# Patient Record
Sex: MALE | Race: WHITE | NOT HISPANIC OR LATINO | Employment: FULL TIME | URBAN - METROPOLITAN AREA
[De-identification: names, ages, dates, MRNs, and addresses within clinical notes are randomized per-mention and may not be internally consistent; named-entity substitution may affect disease eponyms.]

---

## 2020-03-09 ENCOUNTER — OFFICE VISIT (OUTPATIENT)
Dept: URGENT CARE | Facility: MEDICAL CENTER | Age: 55
End: 2020-03-09
Payer: COMMERCIAL

## 2020-03-09 VITALS
TEMPERATURE: 97.4 F | BODY MASS INDEX: 32.13 KG/M2 | HEART RATE: 95 BPM | DIASTOLIC BLOOD PRESSURE: 78 MMHG | WEIGHT: 212 LBS | RESPIRATION RATE: 19 BRPM | SYSTOLIC BLOOD PRESSURE: 122 MMHG | HEIGHT: 68 IN | OXYGEN SATURATION: 97 %

## 2020-03-09 DIAGNOSIS — M79.651 PAIN OF RIGHT THIGH: Primary | ICD-10-CM

## 2020-03-09 PROCEDURE — G0382 LEV 3 HOSP TYPE B ED VISIT: HCPCS | Performed by: PHYSICIAN ASSISTANT

## 2020-03-09 RX ORDER — KETOROLAC TROMETHAMINE 30 MG/ML
30 INJECTION, SOLUTION INTRAMUSCULAR; INTRAVENOUS ONCE
Status: COMPLETED | OUTPATIENT
Start: 2020-03-09 | End: 2020-03-09

## 2020-03-09 RX ORDER — CYCLOBENZAPRINE HCL 10 MG
10 TABLET ORAL 3 TIMES DAILY PRN
Qty: 20 TABLET | Refills: 0 | Status: SHIPPED | OUTPATIENT
Start: 2020-03-09 | End: 2020-05-26

## 2020-03-09 RX ADMIN — KETOROLAC TROMETHAMINE 30 MG: 30 INJECTION, SOLUTION INTRAMUSCULAR; INTRAVENOUS at 17:13

## 2020-03-09 NOTE — PROGRESS NOTES
[facility]        NAME: Olivier Chau is a 63 y o  male  : 1965    MRN: [id number]  DATE: 2020  TIME: 5:06 PM    Assessment and Plan   Pain of right thigh [M79 [id number]]  1  Pain of right thigh  cyclobenzaprine (FLEXERIL) 10 mg tablet    ketorolac (TORADOL) injection 30 mg         Patient Instructions   Tylenol or Motrin for pain  Muscle relaxers at night  Use heat on the thigh  Follow up with PCP in 3-5 days  Proceed to  ER if symptoms worsen  Chief Complaint     Chief Complaint   Patient presents with    Leg Pain     Started today with right upper leg pain, denies injuries, pain is sharp and radiates down leg  Painful to the touch         History of Present Illness       Patient is a 50-year-old male who presents today with right upper leg pain that started this morning  Patient works for [employer] is constantly climbing up and down on his truck  He reports this aggravates the pain  He denies any known injuries  No numbness or tingling  No low back pain  No calf pain, leg swelling, warmth of the leg  He has tried Motrin today with little relief of pain  Review of Systems   Review of Systems   Constitutional: Negative for chills and fever  Respiratory: Negative for cough and shortness of breath  Cardiovascular: Negative for chest pain  Musculoskeletal: Positive for myalgias  Negative for back pain and joint swelling  Skin: Negative for rash  Neurological: Negative for numbness           Current Medications       Current Outpatient Medications:     cyclobenzaprine (FLEXERIL) 10 mg tablet, Take 1 tablet (10 mg total) by mouth 3 (three) times a day as needed for muscle spasms, Disp: 20 tablet, Rfl: 0    Current Facility-Administered Medications:     ketorolac (TORADOL) injection 30 mg, 30 mg, Intramuscular, Once, Rosalina Hung PA-C    Current Allergies     Allergies as of 2020    (No Known Allergies)            The following portions of the patient's history were reviewed and updated as appropriate: allergies, current medications, past family history, past medical history, past social history, past surgical history and problem list      History reviewed  No pertinent past medical history  Past Surgical History:   Procedure Laterality Date    HERNIA REPAIR         Family History   Problem Relation Age of Onset    Cancer Mother     No Known Problems Father          Medications have been verified  Objective   /78   Pulse 95   Temp (!) 97 4 °F (36 3 °C) (Temporal)   Resp 19   Ht 5' 8" (1 727 m)   Wt 96 2 kg (212 lb)   SpO2 97%   BMI 32 23 kg/m²        Physical Exam     Physical Exam   Constitutional: He appears well-developed and well-nourished  No distress  Cardiovascular: Normal rate and regular rhythm  Pulmonary/Chest: Effort normal and breath sounds normal    Musculoskeletal: Normal range of motion  He exhibits tenderness  He exhibits no edema or deformity  Right upper leg: He exhibits tenderness  He exhibits no bony tenderness, no swelling, no edema, no deformity and no laceration          Legs:

## 2020-05-26 ENCOUNTER — APPOINTMENT (OUTPATIENT)
Dept: RADIOLOGY | Facility: MEDICAL CENTER | Age: 55
End: 2020-05-26
Payer: COMMERCIAL

## 2020-05-26 ENCOUNTER — DOCUMENTATION (OUTPATIENT)
Dept: FAMILY MEDICINE CLINIC | Facility: CLINIC | Age: 55
End: 2020-05-26

## 2020-05-26 ENCOUNTER — OFFICE VISIT (OUTPATIENT)
Dept: FAMILY MEDICINE CLINIC | Facility: CLINIC | Age: 55
End: 2020-05-26
Payer: COMMERCIAL

## 2020-05-26 VITALS
TEMPERATURE: 98.3 F | HEART RATE: 96 BPM | SYSTOLIC BLOOD PRESSURE: 128 MMHG | HEIGHT: 68 IN | WEIGHT: 215 LBS | OXYGEN SATURATION: 94 % | BODY MASS INDEX: 32.58 KG/M2 | DIASTOLIC BLOOD PRESSURE: 88 MMHG | RESPIRATION RATE: 16 BRPM

## 2020-05-26 DIAGNOSIS — M79.672 FOOT PAIN, LEFT: Primary | ICD-10-CM

## 2020-05-26 DIAGNOSIS — Z12.5 SCREENING FOR PROSTATE CANCER: ICD-10-CM

## 2020-05-26 DIAGNOSIS — E66.9 CLASS 1 OBESITY WITH BODY MASS INDEX (BMI) OF 32.0 TO 32.9 IN ADULT, UNSPECIFIED OBESITY TYPE, UNSPECIFIED WHETHER SERIOUS COMORBIDITY PRESENT: ICD-10-CM

## 2020-05-26 DIAGNOSIS — B35.1 ONYCHOMYCOSIS: ICD-10-CM

## 2020-05-26 DIAGNOSIS — M79.672 FOOT PAIN, LEFT: ICD-10-CM

## 2020-05-26 DIAGNOSIS — R53.83 FATIGUE, UNSPECIFIED TYPE: ICD-10-CM

## 2020-05-26 PROCEDURE — 73630 X-RAY EXAM OF FOOT: CPT

## 2020-05-26 PROCEDURE — 3008F BODY MASS INDEX DOCD: CPT | Performed by: INTERNAL MEDICINE

## 2020-05-26 PROCEDURE — 1036F TOBACCO NON-USER: CPT | Performed by: INTERNAL MEDICINE

## 2020-05-26 PROCEDURE — 99203 OFFICE O/P NEW LOW 30 MIN: CPT | Performed by: INTERNAL MEDICINE

## 2020-05-26 RX ORDER — MELOXICAM 15 MG/1
15 TABLET ORAL DAILY
Qty: 30 TABLET | Refills: 1 | Status: SHIPPED | OUTPATIENT
Start: 2020-05-26 | End: 2020-10-07

## 2020-05-26 RX ORDER — DIPHENOXYLATE HYDROCHLORIDE AND ATROPINE SULFATE 2.5; .025 MG/1; MG/1
1 TABLET ORAL DAILY
COMMUNITY

## 2020-06-04 ENCOUNTER — TELEPHONE (OUTPATIENT)
Dept: FAMILY MEDICINE CLINIC | Facility: CLINIC | Age: 55
End: 2020-06-04

## 2020-10-07 ENCOUNTER — OFFICE VISIT (OUTPATIENT)
Dept: FAMILY MEDICINE CLINIC | Facility: CLINIC | Age: 55
End: 2020-10-07
Payer: COMMERCIAL

## 2020-10-07 VITALS
WEIGHT: 214 LBS | SYSTOLIC BLOOD PRESSURE: 114 MMHG | HEART RATE: 105 BPM | DIASTOLIC BLOOD PRESSURE: 82 MMHG | OXYGEN SATURATION: 98 % | BODY MASS INDEX: 32.43 KG/M2 | HEIGHT: 68 IN | TEMPERATURE: 98.2 F | RESPIRATION RATE: 16 BRPM

## 2020-10-07 DIAGNOSIS — Z23 ENCOUNTER FOR IMMUNIZATION: ICD-10-CM

## 2020-10-07 DIAGNOSIS — M54.2 NECK PAIN: Primary | ICD-10-CM

## 2020-10-07 PROCEDURE — 3725F SCREEN DEPRESSION PERFORMED: CPT | Performed by: INTERNAL MEDICINE

## 2020-10-07 PROCEDURE — 90471 IMMUNIZATION ADMIN: CPT | Performed by: INTERNAL MEDICINE

## 2020-10-07 PROCEDURE — 90682 RIV4 VACC RECOMBINANT DNA IM: CPT | Performed by: INTERNAL MEDICINE

## 2020-10-07 PROCEDURE — 1036F TOBACCO NON-USER: CPT | Performed by: INTERNAL MEDICINE

## 2020-10-07 PROCEDURE — 99213 OFFICE O/P EST LOW 20 MIN: CPT | Performed by: INTERNAL MEDICINE

## 2020-10-07 RX ORDER — IBUPROFEN 600 MG/1
600 TABLET ORAL EVERY 8 HOURS PRN
Qty: 30 TABLET | Refills: 0 | Status: SHIPPED | OUTPATIENT
Start: 2020-10-07

## 2020-10-07 RX ORDER — CYCLOBENZAPRINE HCL 10 MG
10 TABLET ORAL
Qty: 15 TABLET | Refills: 0 | Status: SHIPPED | OUTPATIENT
Start: 2020-10-07

## 2020-10-07 RX ORDER — METHYLPREDNISOLONE 4 MG/1
TABLET ORAL
Qty: 21 EACH | Refills: 0 | Status: SHIPPED | OUTPATIENT
Start: 2020-10-07 | End: 2020-12-12 | Stop reason: SDUPTHER

## 2020-12-12 DIAGNOSIS — M54.2 NECK PAIN: ICD-10-CM

## 2020-12-12 RX ORDER — METHYLPREDNISOLONE 4 MG/1
TABLET ORAL
Qty: 21 EACH | Refills: 0 | Status: SHIPPED | OUTPATIENT
Start: 2020-12-12

## 2021-04-08 DIAGNOSIS — Z23 ENCOUNTER FOR IMMUNIZATION: ICD-10-CM

## 2021-05-06 ENCOUNTER — TELEPHONE (OUTPATIENT)
Dept: FAMILY MEDICINE CLINIC | Facility: CLINIC | Age: 56
End: 2021-05-06

## 2021-05-06 NOTE — TELEPHONE ENCOUNTER
LMOM for patient to call his insurance and change his  with them   Bring name of representative and call reference #

## 2021-05-07 ENCOUNTER — OFFICE VISIT (OUTPATIENT)
Dept: FAMILY MEDICINE CLINIC | Facility: CLINIC | Age: 56
End: 2021-05-07
Payer: COMMERCIAL

## 2021-05-07 VITALS
OXYGEN SATURATION: 97 % | BODY MASS INDEX: 32.13 KG/M2 | RESPIRATION RATE: 18 BRPM | HEIGHT: 68 IN | HEART RATE: 103 BPM | DIASTOLIC BLOOD PRESSURE: 78 MMHG | SYSTOLIC BLOOD PRESSURE: 110 MMHG | TEMPERATURE: 97.6 F | WEIGHT: 212 LBS

## 2021-05-07 DIAGNOSIS — Z86.010 HISTORY OF COLON POLYPS: ICD-10-CM

## 2021-05-07 DIAGNOSIS — H60.91 OTITIS EXTERNA OF RIGHT EAR, UNSPECIFIED CHRONICITY, UNSPECIFIED TYPE: Primary | ICD-10-CM

## 2021-05-07 DIAGNOSIS — M19.90 OSTEOARTHRITIS, UNSPECIFIED OSTEOARTHRITIS TYPE, UNSPECIFIED SITE: ICD-10-CM

## 2021-05-07 PROCEDURE — 3008F BODY MASS INDEX DOCD: CPT | Performed by: INTERNAL MEDICINE

## 2021-05-07 PROCEDURE — 99214 OFFICE O/P EST MOD 30 MIN: CPT | Performed by: INTERNAL MEDICINE

## 2021-05-07 PROCEDURE — 3725F SCREEN DEPRESSION PERFORMED: CPT | Performed by: INTERNAL MEDICINE

## 2021-05-07 PROCEDURE — 1036F TOBACCO NON-USER: CPT | Performed by: INTERNAL MEDICINE

## 2021-05-07 RX ORDER — METHYLPREDNISOLONE 4 MG/1
TABLET ORAL
Qty: 21 EACH | Refills: 0 | Status: SHIPPED | OUTPATIENT
Start: 2021-05-07

## 2021-05-07 RX ORDER — CIPROFLOXACIN AND DEXAMETHASONE 3; 1 MG/ML; MG/ML
4 SUSPENSION/ DROPS AURICULAR (OTIC) 2 TIMES DAILY
Qty: 7.5 ML | Refills: 0 | Status: SHIPPED | OUTPATIENT
Start: 2021-05-07

## 2021-05-07 NOTE — PROGRESS NOTES
BMI Counseling: Body mass index is 32 23 kg/m²  The BMI is above normal  Nutrition recommendations include decreasing portion sizes and limiting drinks that contain sugar  Exercise recommendations include exercising 3-5 times per week  No pharmacotherapy was ordered  Patient referred to PCP due to patient being overweight  Assessment/Plan:         Diagnoses and all orders for this visit:    Otitis externa of right ear, unspecified chronicity, unspecified type  Comments:  ciprodex  Orders:  -     ciprofloxacin-dexamethasone (CIPRODEX) otic suspension; Administer 4 drops to the right ear 2 (two) times a day    History of colon polyps  Comments:  due for scope  Orders:  -     Ambulatory referral to Gastroenterology; Future    Osteoarthritis, unspecified osteoarthritis type, unspecified site  Comments:  request medrol for foot pain  Orders:  -     methylPREDNISolone 4 MG tablet therapy pack; Use as directed on package          Subjective:      Patient ID: Sameer Oneil is a 61 y o  male  Pt complains of rt ear ache x 1 week  It became worse 1-2 days ago  When he rolls on it with sleeping it awakens him      Earache   This is a new problem  The current episode started in the past 7 days  The problem occurs constantly  The problem has been gradually worsening  There has been no fever  The pain is at a severity of 3/10  Associated symptoms include ear discharge  Pertinent negatives include no abdominal pain, coughing, diarrhea, headaches, hearing loss, neck pain, rash, rhinorrhea, sore throat or vomiting  The following portions of the patient's history were reviewed and updated as appropriate: He  has no past medical history on file  He There are no active problems to display for this patient  He  has a past surgical history that includes Hernia repair and Prostate biopsy (Bilateral)  His family history includes Cancer in his mother; No Known Problems in his father    He  reports that he quit smoking about 9 years ago  He has a 10 00 pack-year smoking history  He has never used smokeless tobacco  He reports current alcohol use  He reports that he does not use drugs  Current Outpatient Medications   Medication Sig Dispense Refill    cyclobenzaprine (FLEXERIL) 10 mg tablet Take 1 tablet (10 mg total) by mouth daily at bedtime as needed for muscle spasms (take on nights that you do not have to get up for work the following morning) 15 tablet 0    ibuprofen (MOTRIN) 600 mg tablet Take 1 tablet (600 mg total) by mouth every 8 (eight) hours as needed for mild pain 30 tablet 0    methylPREDNISolone 4 MG tablet therapy pack Use as directed on package 21 each 0    multivitamin (THERAGRAN) TABS Take 1 tablet by mouth daily      ciprofloxacin-dexamethasone (CIPRODEX) otic suspension Administer 4 drops to the right ear 2 (two) times a day 7 5 mL 0    methylPREDNISolone 4 MG tablet therapy pack Use as directed on package 21 each 0     No current facility-administered medications for this visit  Current Outpatient Medications on File Prior to Visit   Medication Sig    cyclobenzaprine (FLEXERIL) 10 mg tablet Take 1 tablet (10 mg total) by mouth daily at bedtime as needed for muscle spasms (take on nights that you do not have to get up for work the following morning)    ibuprofen (MOTRIN) 600 mg tablet Take 1 tablet (600 mg total) by mouth every 8 (eight) hours as needed for mild pain    methylPREDNISolone 4 MG tablet therapy pack Use as directed on package    multivitamin (THERAGRAN) TABS Take 1 tablet by mouth daily     No current facility-administered medications on file prior to visit  He has No Known Allergies       Review of Systems   Constitutional: Negative for chills and fever  HENT: Positive for ear discharge and ear pain  Negative for hearing loss, rhinorrhea and sore throat  Respiratory: Negative for cough  Gastrointestinal: Negative for abdominal pain, diarrhea and vomiting  Musculoskeletal: Negative for neck pain  Skin: Negative for rash  Neurological: Negative for headaches  Objective:      /78   Pulse 103   Temp 97 6 °F (36 4 °C)   Resp 18   Ht 5' 8" (1 727 m)   Wt 96 2 kg (212 lb)   SpO2 97%   BMI 32 23 kg/m²          Physical Exam  Constitutional:       Appearance: He is obese  HENT:      Head: Normocephalic and atraumatic  Right Ear: Tympanic membrane and ear canal normal       Left Ear: Tympanic membrane and ear canal normal    Neck:      Musculoskeletal: Neck supple  Cardiovascular:      Rate and Rhythm: Normal rate and regular rhythm  Pulmonary:      Effort: Pulmonary effort is normal       Breath sounds: Normal breath sounds  Neurological:      Mental Status: He is alert  Answers for HPI/ROS submitted by the patient on 5/6/2021   Ear pain  Chronicity: new  Onset: in the past 7 days  Progression since onset: gradually worsening  Frequency: constantly  Fever: no fever  Pain - numeric: 3/10  abdominal pain: No  ear discharge:  Yes  rash: No  cough: No  headaches: No  rhinorrhea: No  diarrhea: No  hearing loss: No  sore throat: No  neck pain: No  vomiting: No

## 2021-08-13 ENCOUNTER — TELEPHONE (OUTPATIENT)
Dept: GASTROENTEROLOGY | Facility: CLINIC | Age: 56
End: 2021-08-13

## 2021-08-13 NOTE — TELEPHONE ENCOUNTER
Called and spoke with patient regarding scheduling a colonoscopy he is due for with Dr Loli Ponce for hx of adenoma polyps  He is busy with work and people being out, hard to get a day off  He will call then end of summer, he hasn't forgotten he is due  Will wait for patient to call

## 2022-09-02 DIAGNOSIS — Z13.220 SCREENING FOR CHOLESTEROL LEVEL: ICD-10-CM

## 2022-09-02 DIAGNOSIS — Z12.5 SCREENING FOR PROSTATE CANCER: ICD-10-CM

## 2022-09-02 DIAGNOSIS — Z00.00 BLOOD TESTS FOR ROUTINE GENERAL PHYSICAL EXAMINATION: ICD-10-CM

## 2022-09-03 ENCOUNTER — APPOINTMENT (OUTPATIENT)
Dept: LAB | Facility: MEDICAL CENTER | Age: 57
End: 2022-09-03
Payer: COMMERCIAL

## 2022-09-03 DIAGNOSIS — Z00.00 BLOOD TESTS FOR ROUTINE GENERAL PHYSICAL EXAMINATION: ICD-10-CM

## 2022-09-03 DIAGNOSIS — Z13.220 SCREENING FOR CHOLESTEROL LEVEL: ICD-10-CM

## 2022-09-03 DIAGNOSIS — Z12.5 SCREENING FOR PROSTATE CANCER: ICD-10-CM

## 2022-09-03 LAB
ALBUMIN SERPL BCP-MCNC: 3.8 G/DL (ref 3.5–5)
ALP SERPL-CCNC: 67 U/L (ref 46–116)
ALT SERPL W P-5'-P-CCNC: 27 U/L (ref 12–78)
ANION GAP SERPL CALCULATED.3IONS-SCNC: 6 MMOL/L (ref 4–13)
AST SERPL W P-5'-P-CCNC: 20 U/L (ref 5–45)
BASOPHILS # BLD AUTO: 0.04 THOUSANDS/ΜL (ref 0–0.1)
BASOPHILS NFR BLD AUTO: 0 % (ref 0–1)
BILIRUB SERPL-MCNC: 0.69 MG/DL (ref 0.2–1)
BUN SERPL-MCNC: 24 MG/DL (ref 5–25)
CALCIUM SERPL-MCNC: 9.1 MG/DL (ref 8.3–10.1)
CHLORIDE SERPL-SCNC: 108 MMOL/L (ref 96–108)
CHOLEST SERPL-MCNC: 201 MG/DL
CO2 SERPL-SCNC: 24 MMOL/L (ref 21–32)
CREAT SERPL-MCNC: 1.71 MG/DL (ref 0.6–1.3)
EOSINOPHIL # BLD AUTO: 0.23 THOUSAND/ΜL (ref 0–0.61)
EOSINOPHIL NFR BLD AUTO: 3 % (ref 0–6)
ERYTHROCYTE [DISTWIDTH] IN BLOOD BY AUTOMATED COUNT: 12.3 % (ref 11.6–15.1)
GFR SERPL CREATININE-BSD FRML MDRD: 43 ML/MIN/1.73SQ M
GLUCOSE P FAST SERPL-MCNC: 98 MG/DL (ref 65–99)
HCT VFR BLD AUTO: 44.3 % (ref 36.5–49.3)
HDLC SERPL-MCNC: 43 MG/DL
HGB BLD-MCNC: 14.6 G/DL (ref 12–17)
IMM GRANULOCYTES # BLD AUTO: 0.03 THOUSAND/UL (ref 0–0.2)
IMM GRANULOCYTES NFR BLD AUTO: 0 % (ref 0–2)
LDLC SERPL CALC-MCNC: 137 MG/DL (ref 0–100)
LYMPHOCYTES # BLD AUTO: 2.13 THOUSANDS/ΜL (ref 0.6–4.47)
LYMPHOCYTES NFR BLD AUTO: 24 % (ref 14–44)
MCH RBC QN AUTO: 30 PG (ref 26.8–34.3)
MCHC RBC AUTO-ENTMCNC: 33 G/DL (ref 31.4–37.4)
MCV RBC AUTO: 91 FL (ref 82–98)
MONOCYTES # BLD AUTO: 0.76 THOUSAND/ΜL (ref 0.17–1.22)
MONOCYTES NFR BLD AUTO: 9 % (ref 4–12)
NEUTROPHILS # BLD AUTO: 5.79 THOUSANDS/ΜL (ref 1.85–7.62)
NEUTS SEG NFR BLD AUTO: 64 % (ref 43–75)
NONHDLC SERPL-MCNC: 158 MG/DL
NRBC BLD AUTO-RTO: 0 /100 WBCS
PLATELET # BLD AUTO: 222 THOUSANDS/UL (ref 149–390)
PMV BLD AUTO: 10.2 FL (ref 8.9–12.7)
POTASSIUM SERPL-SCNC: 4.3 MMOL/L (ref 3.5–5.3)
PROT SERPL-MCNC: 7 G/DL (ref 6.4–8.4)
PSA SERPL-MCNC: 4.8 NG/ML (ref 0–4)
RBC # BLD AUTO: 4.86 MILLION/UL (ref 3.88–5.62)
SODIUM SERPL-SCNC: 138 MMOL/L (ref 135–147)
TRIGL SERPL-MCNC: 105 MG/DL
WBC # BLD AUTO: 8.98 THOUSAND/UL (ref 4.31–10.16)

## 2022-09-03 PROCEDURE — 36415 COLL VENOUS BLD VENIPUNCTURE: CPT

## 2022-09-03 PROCEDURE — 80053 COMPREHEN METABOLIC PANEL: CPT

## 2022-09-03 PROCEDURE — G0103 PSA SCREENING: HCPCS

## 2022-09-03 PROCEDURE — 85025 COMPLETE CBC W/AUTO DIFF WBC: CPT

## 2022-09-03 PROCEDURE — 80061 LIPID PANEL: CPT

## 2022-09-06 DIAGNOSIS — N18.30 STAGE 3 CHRONIC KIDNEY DISEASE, UNSPECIFIED WHETHER STAGE 3A OR 3B CKD (HCC): Primary | ICD-10-CM

## 2022-09-06 DIAGNOSIS — R97.20 ELEVATED PSA: ICD-10-CM

## 2022-09-17 ENCOUNTER — APPOINTMENT (OUTPATIENT)
Dept: LAB | Facility: MEDICAL CENTER | Age: 57
End: 2022-09-17
Payer: COMMERCIAL

## 2022-09-17 DIAGNOSIS — N18.30 STAGE 3 CHRONIC KIDNEY DISEASE, UNSPECIFIED WHETHER STAGE 3A OR 3B CKD (HCC): ICD-10-CM

## 2022-09-17 LAB
ANION GAP SERPL CALCULATED.3IONS-SCNC: 4 MMOL/L (ref 4–13)
BUN SERPL-MCNC: 31 MG/DL (ref 5–25)
CALCIUM SERPL-MCNC: 8.7 MG/DL (ref 8.3–10.1)
CHLORIDE SERPL-SCNC: 107 MMOL/L (ref 96–108)
CO2 SERPL-SCNC: 25 MMOL/L (ref 21–32)
CREAT SERPL-MCNC: 1.49 MG/DL (ref 0.6–1.3)
GFR SERPL CREATININE-BSD FRML MDRD: 51 ML/MIN/1.73SQ M
GLUCOSE P FAST SERPL-MCNC: 106 MG/DL (ref 65–99)
POTASSIUM SERPL-SCNC: 4.2 MMOL/L (ref 3.5–5.3)
SODIUM SERPL-SCNC: 136 MMOL/L (ref 135–147)

## 2022-09-17 PROCEDURE — 80048 BASIC METABOLIC PNL TOTAL CA: CPT

## 2022-09-17 PROCEDURE — 36415 COLL VENOUS BLD VENIPUNCTURE: CPT

## 2022-09-29 ENCOUNTER — OFFICE VISIT (OUTPATIENT)
Dept: FAMILY MEDICINE CLINIC | Facility: CLINIC | Age: 57
End: 2022-09-29
Payer: COMMERCIAL

## 2022-09-29 VITALS
SYSTOLIC BLOOD PRESSURE: 102 MMHG | HEIGHT: 68 IN | RESPIRATION RATE: 16 BRPM | BODY MASS INDEX: 32.89 KG/M2 | OXYGEN SATURATION: 97 % | TEMPERATURE: 98.3 F | DIASTOLIC BLOOD PRESSURE: 72 MMHG | WEIGHT: 217 LBS | HEART RATE: 95 BPM

## 2022-09-29 DIAGNOSIS — N52.9 ERECTILE DYSFUNCTION, UNSPECIFIED ERECTILE DYSFUNCTION TYPE: ICD-10-CM

## 2022-09-29 DIAGNOSIS — M79.672 LEFT FOOT PAIN: Primary | ICD-10-CM

## 2022-09-29 DIAGNOSIS — R97.20 ABNORMAL PSA: ICD-10-CM

## 2022-09-29 DIAGNOSIS — Z12.11 SCREEN FOR COLON CANCER: ICD-10-CM

## 2022-09-29 PROCEDURE — 3725F SCREEN DEPRESSION PERFORMED: CPT | Performed by: INTERNAL MEDICINE

## 2022-09-29 PROCEDURE — 99214 OFFICE O/P EST MOD 30 MIN: CPT | Performed by: INTERNAL MEDICINE

## 2022-09-29 RX ORDER — SILDENAFIL 50 MG/1
TABLET, FILM COATED ORAL
Qty: 10 TABLET | Refills: 0 | Status: SHIPPED | OUTPATIENT
Start: 2022-09-29

## 2022-09-29 NOTE — PROGRESS NOTES
BMI Counseling: Body mass index is 32 99 kg/m²  The BMI is above normal  Nutrition recommendations include decreasing portion sizes and limiting drinks that contain sugar  Exercise recommendations include exercising 3-5 times per week  No pharmacotherapy was ordered  Patient referred to PCP  Rationale for BMI follow-up plan is due to patient being overweight or obese  Depression Screening and Follow-up Plan: Patient was screened for depression during today's encounter  They screened negative with a PHQ-2 score of 0  Assessment/Plan:    No problem-specific Assessment & Plan notes found for this encounter  Diagnoses and all orders for this visit:    Left foot pain  Comments:  podiatry  Orders:  -     Ambulatory Referral to Podiatry; Future    Screen for colon cancer  -     Ambulatory Referral to Gastroenterology; Future    Abnormal PSA  Comments:  up coming urology    Erectile dysfunction, unspecified erectile dysfunction type  Comments:  trial viagra  Orders:  -     sildenafil (VIAGRA) 50 MG tablet; Take one tab one hour before desired affect  Subjective:      Patient ID: Jovanni Ibarra is a 61 y o  male  Pt complains of left foot pain  It was worse with getting in and out of his truck  Pt states foot still hurts  tx'd with meloxicam but felt dizzy with it  Also crt up and cut out nsaid  rec tylenol  He received a call from urology  Will f/u with them  He says gets am congestion q fall with allergins  Using otc and it helps      The following portions of the patient's history were reviewed and updated as appropriate: He  has no past medical history on file  He There are no problems to display for this patient  He  has a past surgical history that includes Hernia repair and Prostate biopsy (Bilateral)  His family history includes Cancer in his mother; No Known Problems in his father  He  reports that he quit smoking about 20 months ago  His smoking use included cigarettes   He has a 10 00 pack-year smoking history  He has never used smokeless tobacco  He reports current alcohol use  He reports that he does not use drugs  Current Outpatient Medications   Medication Sig Dispense Refill    multivitamin (THERAGRAN) TABS Take 1 tablet by mouth daily      sildenafil (VIAGRA) 50 MG tablet Take one tab one hour before desired affect  10 tablet 0    ciprofloxacin-dexamethasone (CIPRODEX) otic suspension Administer 4 drops to the right ear 2 (two) times a day (Patient not taking: Reported on 9/29/2022) 7 5 mL 0    cyclobenzaprine (FLEXERIL) 10 mg tablet Take 1 tablet (10 mg total) by mouth daily at bedtime as needed for muscle spasms (take on nights that you do not have to get up for work the following morning) (Patient not taking: Reported on 9/29/2022) 15 tablet 0     No current facility-administered medications for this visit  Current Outpatient Medications on File Prior to Visit   Medication Sig    multivitamin (THERAGRAN) TABS Take 1 tablet by mouth daily    ciprofloxacin-dexamethasone (CIPRODEX) otic suspension Administer 4 drops to the right ear 2 (two) times a day (Patient not taking: Reported on 9/29/2022)    cyclobenzaprine (FLEXERIL) 10 mg tablet Take 1 tablet (10 mg total) by mouth daily at bedtime as needed for muscle spasms (take on nights that you do not have to get up for work the following morning) (Patient not taking: Reported on 9/29/2022)    [DISCONTINUED] ibuprofen (MOTRIN) 600 mg tablet Take 1 tablet (600 mg total) by mouth every 8 (eight) hours as needed for mild pain (Patient not taking: Reported on 9/29/2022)     No current facility-administered medications on file prior to visit  He has No Known Allergies       Review of Systems   Constitutional: Negative  HENT: Negative  Respiratory: Negative  Cardiovascular: Negative            Objective:      /72 (BP Location: Left arm, Patient Position: Sitting, Cuff Size: Large)   Pulse 95   Temp 98 3 °F (36 8 °C) (Temporal)   Resp 16   Ht 5' 8" (1 727 m)   Wt 98 4 kg (217 lb)   SpO2 97%   BMI 32 99 kg/m²          Physical Exam  Constitutional:       Appearance: He is obese  HENT:      Head: Normocephalic and atraumatic  Right Ear: Tympanic membrane and ear canal normal       Left Ear: Tympanic membrane and ear canal normal    Cardiovascular:      Rate and Rhythm: Normal rate and regular rhythm  Pulmonary:      Effort: Pulmonary effort is normal       Breath sounds: Normal breath sounds  Musculoskeletal:      Cervical back: Neck supple  Lymphadenopathy:      Cervical: No cervical adenopathy  Neurological:      Mental Status: He is alert

## 2022-11-01 ENCOUNTER — TELEPHONE (OUTPATIENT)
Dept: UROLOGY | Facility: AMBULATORY SURGERY CENTER | Age: 57
End: 2022-11-01

## 2022-11-01 NOTE — TELEPHONE ENCOUNTER
What is the reason for the patient’s appointment? NP- elevated PSA 4 8    Patient can be reached at [phone]     What office location does the patient prefer? [facility] we accept the patient's insurance or is the patient Self-Pay? [employer]    Has the patient had any previous Urologist(s)? No    Have patient records been requested? No    Has the patient had any outside testing done? No    Does the patient have a personal history of cancer?   No

## 2022-11-30 ENCOUNTER — OFFICE VISIT (OUTPATIENT)
Dept: UROLOGY | Facility: CLINIC | Age: 57
End: 2022-11-30

## 2022-11-30 VITALS
BODY MASS INDEX: 31.83 KG/M2 | DIASTOLIC BLOOD PRESSURE: 84 MMHG | WEIGHT: 210 LBS | HEIGHT: 68 IN | SYSTOLIC BLOOD PRESSURE: 122 MMHG

## 2022-11-30 DIAGNOSIS — R97.20 ELEVATED PSA: Primary | ICD-10-CM

## 2022-11-30 DIAGNOSIS — R53.83 FATIGUE, UNSPECIFIED TYPE: ICD-10-CM

## 2022-11-30 NOTE — PROGRESS NOTES
UROLOGY CONSULTATION NOTE     Patient Identifiers: Leonarda Mcdonald (MRN: [id number])  Service Requesting Consultation: Camden Flores DO  Service Providing Consultation:  Urology, Sarah Hernandez PA-C  Consults  Date of Service: 2022    Reason for Consultation:  Elevated PSA    History of Present Illness:     Leonarda Mcdonald is a 61 y o  male with history of elevated PSA  He is referred from primary care for follow-up  He apparently had a prostate biopsy by Dr David Tsai about 10 years ago for elevated PSA which he reports was higher than the current 4 8  He denies any voiding complaints  He denies any family history of prostate cancer  He does have some fatigue and low libido  There is no upper tract imaging  There is no comparison PSA  Past Medical, Past Surgical History:   History reviewed  No pertinent past medical history :    Past Surgical History:   Procedure Laterality Date   • HERNIA REPAIR     • PROSTATE BIOPSY Bilateral    :    Medications, Allergies:     Current Outpatient Medications:   •  multivitamin (THERAGRAN) TABS, Take 1 tablet by mouth daily, Disp: , Rfl:   •  sildenafil (VIAGRA) 50 MG tablet, Take one tab one hour before desired affect , Disp: 10 tablet, Rfl: 0  •  Diclofenac Sodium (VOLTAREN) 1 %, apply 4 grams topically to affected area three times a day, Disp: , Rfl:     Allergies:  No Known Allergies:    Social and Family History:   Social History:   Social History     Tobacco Use   • Smoking status: Former     Packs/day: 0 50     Years: 20 00     Pack years: 10 00     Types: Cigarettes     Quit date:      Years since quittin 9   • Smokeless tobacco: Never   Vaping Use   • Vaping Use: Never used   Substance Use Topics   • Alcohol use: Yes     Comment: occasional   • Drug use: Never         Social History     Tobacco Use   Smoking Status Former   • Packs/day: 0 50   • Years: 20 00   • Pack years: 10 00   • Types: Cigarettes   • Quit date:    • Years since quittin 9 Smokeless Tobacco Never       Family History:  Family History   Problem Relation Age of Onset   • Cancer Mother    • No Known Problems Father    :     Review of Systems:     General: Fever, chills, or night sweats: negative  Cardiac: Negative for chest pain  Pulmonary: Negative for shortness of breath  Gastrointestinal: Abdominal pain negative  Nausea, vomiting, or diarrhea negative,  Genitourinary: See HPI above  Patient does not have hematuria  All other systems queried were negative  Physical Exam:   General: Patient is pleasant and in NAD  Awake and alert  /84 (BP Location: Left arm, Patient Position: Sitting, Cuff Size: Adult)   Ht 5' 8" (1 727 m)   Wt 95 3 kg (210 lb)   BMI 31 93 kg/m²   HEENT:  Conjunctiva are clear  Constitutional:  pleasant and cooperative     no apparent distress  Cardiac: Peripheral edema: negative  Pulmonary: Non-labored breathing  Abdomen: Soft, non-tender, non-distended  No surgical scars  No masses, tenderness, hernias noted  Genitourinary: Negative CVA tenderness, negative suprapubic tenderness  Extremities:  Moves all extremities  Neurological:CNII-XII intact  No numbness or tingling  Essentially non focal neurologic exam  Psychiatric:mood affect and behavior normal    (Male): Penis circumcised, phallus normal, meatus patent  Testicles descended into scrotum bilaterally without masses nor tenderness  No inguinal hernias bilaterally  AR: Prostate is enlarged at 35 grams  The prostate is not boggy  The prostate is not tender  No nodules noted          Labs:     Lab Results   Component Value Date    HGB 14 6 09/03/2022    HCT 44 3 09/03/2022    WBC 8 98 09/03/2022     09/03/2022   ]    Lab Results   Component Value Date    K 4 2 09/17/2022     09/17/2022    CO2 25 09/17/2022    BUN 31 (H) 09/17/2022    CREATININE 1 49 (H) 09/17/2022    CALCIUM 8 7 09/17/2022   ]    Imaging:   I personally reviewed the images and report of the following studies, and reviewed them with the patient:  None      ASSESSMENT:     1  Elevated PSA  2  Fatigue    PLAN:   -follow-up in 6 months his testosterone and PSA prior to visit  -we discussed the need for multiparametric MRI if the PSA should go higher    Thank you for allowing me to participate in this patients’ care  Please do not hesitate to call with any additional questions    Alaina Craig PA-C

## 2023-05-15 ENCOUNTER — TELEPHONE (OUTPATIENT)
Dept: OTHER | Facility: OTHER | Age: 58
End: 2023-05-15

## 2023-05-15 NOTE — TELEPHONE ENCOUNTER
Patient is calling regarding cancelling an appointment      Date/Time: 05/15/2023 @ 1515    Patient was rescheduled: YES [x] NO []    Patient requesting call back to reschedule: YES [] NO [x]

## 2023-05-27 ENCOUNTER — APPOINTMENT (OUTPATIENT)
Dept: LAB | Facility: MEDICAL CENTER | Age: 58
End: 2023-05-27

## 2023-05-27 DIAGNOSIS — R53.83 FATIGUE, UNSPECIFIED TYPE: ICD-10-CM

## 2023-05-27 DIAGNOSIS — N18.30 STAGE 3 CHRONIC KIDNEY DISEASE, UNSPECIFIED WHETHER STAGE 3A OR 3B CKD (HCC): ICD-10-CM

## 2023-05-27 DIAGNOSIS — R97.20 ELEVATED PSA: ICD-10-CM

## 2023-05-27 LAB
ANION GAP SERPL CALCULATED.3IONS-SCNC: 1 MMOL/L (ref 4–13)
BUN SERPL-MCNC: 24 MG/DL (ref 5–25)
CALCIUM SERPL-MCNC: 9.2 MG/DL (ref 8.3–10.1)
CHLORIDE SERPL-SCNC: 110 MMOL/L (ref 96–108)
CO2 SERPL-SCNC: 28 MMOL/L (ref 21–32)
CREAT SERPL-MCNC: 1.55 MG/DL (ref 0.6–1.3)
GFR SERPL CREATININE-BSD FRML MDRD: 48 ML/MIN/1.73SQ M
GLUCOSE P FAST SERPL-MCNC: 112 MG/DL (ref 65–99)
POTASSIUM SERPL-SCNC: 4.6 MMOL/L (ref 3.5–5.3)
PSA SERPL-MCNC: 9.09 NG/ML (ref 0–4)
SODIUM SERPL-SCNC: 139 MMOL/L (ref 135–147)

## 2023-05-30 LAB
TESTOST FREE SERPL-MCNC: 9.5 PG/ML (ref 7.2–24)
TESTOST SERPL-MCNC: 741 NG/DL (ref 264–916)

## 2023-05-31 ENCOUNTER — OFFICE VISIT (OUTPATIENT)
Dept: UROLOGY | Facility: CLINIC | Age: 58
End: 2023-05-31

## 2023-05-31 VITALS
HEIGHT: 68 IN | BODY MASS INDEX: 31.07 KG/M2 | WEIGHT: 205 LBS | RESPIRATION RATE: 18 BRPM | HEART RATE: 90 BPM | SYSTOLIC BLOOD PRESSURE: 128 MMHG | OXYGEN SATURATION: 97 % | DIASTOLIC BLOOD PRESSURE: 84 MMHG

## 2023-05-31 DIAGNOSIS — R97.20 ELEVATED PSA: Primary | ICD-10-CM

## 2023-05-31 NOTE — PROGRESS NOTES
UROLOGY PROGRESS NOTE   Patient Identifiers: Maulik Gaines (MRN [id number])  Date of Service: 5/31/2023    Subjective:   62-year-old male with history of elevated PSA  He had a prostate biopsy by Dr Karina Dewey about 10 years ago  Last PSA was 4 8  Current PSA is 9 09  He denies any family history of prostate cancer  Testosterone is over 700  Reason for visit: Elevated PSA follow-up    Objective:     VITALS:    There were no vitals filed for this visit    AUA SYMPTOM SCORE    Flowsheet Row Most Recent Value   AUA SYMPTOM SCORE    How often have you had a sensation of not emptying your bladder completely after you finished urinating? 1 (P)     How often have you had to urinate again less than two hours after you finished urinating? 1 (P)     How often have you found you stopped and started again several times when you urinate? 1 (P)     How often have you found it difficult to postpone urination? 0 (P)     How often have you had a weak urinary stream? 1 (P)     How often have you had to push or strain to begin urination? 0 (P)     How many times did you most typically get up to urinate from the time you went to bed at night until the time you got up in the morning? 3 (P)     Quality of Life: If you were to spend the rest of your life with your urinary condition just the way it is now, how would you feel about that? 2 (P)     AUA SYMPTOM SCORE 7 (P)             LABS:  Lab Results   Component Value Date    HCT 44 3 09/03/2022    HGB 14 6 09/03/2022     09/03/2022    WBC 8 98 09/03/2022   ]    Lab Results   Component Value Date    BUN 24 05/27/2023    CALCIUM 9 2 05/27/2023     (H) 05/27/2023    CO2 28 05/27/2023    CREATININE 1 55 (H) 05/27/2023    K 4 6 05/27/2023   ]        INPATIENT MEDS:    Current Outpatient Medications:   •  multivitamin (THERAGRAN) TABS, Take 1 tablet by mouth daily, Disp: , Rfl:   •  sildenafil (VIAGRA) 50 MG tablet, Take one tab one hour before desired affect , Disp: 10 tablet, Rfl: 0  •  Diclofenac Sodium (VOLTAREN) 1 %, apply 4 grams topically to affected area three times a day, Disp: , Rfl:       Physical Exam:   There were no vitals taken for this visit  GEN: no acute distress    RESP: breathing comfortably with no accessory muscle use    ABD: soft, non-tender, non-distended   INCISION:    EXT: no significant peripheral edema       RADIOLOGY:   none     Assessment:   1    Elevated PSA    Plan:   -I recommend a multiparametric MRI  -I will call him for PI-RADS 3 4 or 5 and schedule biopsy  -Otherwise he will follow-up in 6 months with repeat PSA prior to visit  -We discussed the need for prostate biopsy should his PSA remain elevated

## 2023-06-14 ENCOUNTER — APPOINTMENT (OUTPATIENT)
Dept: RADIOLOGY | Facility: MEDICAL CENTER | Age: 58
End: 2023-06-14
Payer: COMMERCIAL

## 2023-06-14 ENCOUNTER — OFFICE VISIT (OUTPATIENT)
Dept: FAMILY MEDICINE CLINIC | Facility: CLINIC | Age: 58
End: 2023-06-14
Payer: COMMERCIAL

## 2023-06-14 VITALS
TEMPERATURE: 97.6 F | OXYGEN SATURATION: 97 % | SYSTOLIC BLOOD PRESSURE: 128 MMHG | BODY MASS INDEX: 32.28 KG/M2 | DIASTOLIC BLOOD PRESSURE: 76 MMHG | HEART RATE: 96 BPM | WEIGHT: 213 LBS | HEIGHT: 68 IN

## 2023-06-14 DIAGNOSIS — M79.644 CHRONIC PAIN OF RIGHT THUMB: ICD-10-CM

## 2023-06-14 DIAGNOSIS — Z12.11 SCREEN FOR COLON CANCER: Primary | ICD-10-CM

## 2023-06-14 DIAGNOSIS — G89.29 CHRONIC PAIN OF RIGHT THUMB: ICD-10-CM

## 2023-06-14 PROCEDURE — 73140 X-RAY EXAM OF FINGER(S): CPT

## 2023-06-14 PROCEDURE — 99214 OFFICE O/P EST MOD 30 MIN: CPT | Performed by: FAMILY MEDICINE

## 2023-06-14 NOTE — PROGRESS NOTES
Assessment/Plan:    1  Screen for colon cancer  -     Ambulatory Referral to Gastroenterology; Future    2  Chronic pain of right thumb  -     XR thumb right first digit-min 2v; Future; Expected date: 06/14/2023    BMI Counseling: Body mass index is 32 39 kg/m²  The BMI is above normal  Nutrition recommendations include decreasing portion sizes, encouraging healthy choices of fruits and vegetables, decreasing fast food intake, consuming healthier snacks, limiting drinks that contain sugar, moderation in carbohydrate intake and increasing intake of lean protein  Exercise recommendations include moderate physical activity 150 minutes/week, exercising 3-5 times per week and strength training exercises  No pharmacotherapy was ordered  Patient referred to PCP  Rationale for BMI follow-up plan is due to patient being overweight or obese  Depression Screening and Follow-up Plan: Patient was screened for depression during today's encounter  They screened negative with a PHQ-2 score of 0  Patient Instructions   XR R thumb, consider OT or visit to hand ortho for steroid injection      No follow-ups on file  Subjective:      Patient ID: Daniel Traylor is a 60 y o  male  Chief Complaint   Patient presents with   • spot on back   • pain in thumbs       Here for lesion on back and b/l thumb pain  Pt noticed a lesion on his back about 1 month ago while drying off after a shower  Does not cause pt pain or bother him  Has had wife watching it over the past few weeks  Pt also concerned about b/l thumb pain that has been ongoing for a few months  More painful with grasping items and shaking hands  Goes to gym several days a week and is still able to do his normal routine, but needs to modify weight lifting to use his palms rather than grasping the bar/weights with his thumbs  Seems to be slightly worse in R>L thumb as he is right hand dominant   Denies numbness, tingling, and he denies pain travelling to the wrist or up "the arm  He admits to previous dx of arthritis in other joints including his left foot, b/l knees, right wrist  Has had steroid injection in right knee and tolerated procedure well  Pt has had rx for Meloxicam in the past but had ADR of dizziness and discontinued this  The following portions of the patient's history were reviewed and updated as appropriate: allergies, current medications, past family history, past medical history, past social history, past surgical history and problem list     Review of Systems   Constitutional: Negative for activity change, fatigue and fever  Respiratory: Negative for shortness of breath  Cardiovascular: Negative for chest pain  Gastrointestinal: Negative for abdominal pain, constipation, diarrhea and vomiting  Musculoskeletal: Positive for arthralgias (b/l thumb joints)  Negative for joint swelling  Skin:        Small, raised lesion of back   Neurological: Negative for dizziness, weakness and headaches  Psychiatric/Behavioral: Negative for confusion and sleep disturbance  Current Outpatient Medications   Medication Sig Dispense Refill   • Diclofenac Sodium (VOLTAREN) 1 % apply 4 grams topically to affected area three times a day     • multivitamin (THERAGRAN) TABS Take 1 tablet by mouth daily     • sildenafil (VIAGRA) 50 MG tablet Take one tab one hour before desired affect  (Patient not taking: Reported on 6/14/2023) 10 tablet 0     No current facility-administered medications for this visit  Objective:    /76 (BP Location: Right arm, Patient Position: Sitting, Cuff Size: Standard)   Pulse 96   Temp 97 6 °F (36 4 °C)   Ht 5' 8\" (1 727 m)   Wt 96 6 kg (213 lb)   SpO2 97%   BMI 32 39 kg/m²        Physical Exam  Vitals reviewed  Constitutional:       General: He is not in acute distress  Appearance: Normal appearance  Cardiovascular:      Rate and Rhythm: Normal rate and regular rhythm  Heart sounds: Normal heart sounds   No " "murmur heard  Pulmonary:      Effort: Pulmonary effort is normal  No respiratory distress  Breath sounds: Normal breath sounds  No wheezing  Musculoskeletal:         General: Tenderness present  Comments: Pain with palpation of thumb DIP b/l  Pain with grasping examiner's hand in a \"handshake\"    Skin:     General: Skin is warm and dry  Findings: Lesion (small, linear, raised, hyperpigemented lesion at the midline of the low back  Likely seborrheic keratosis) present  Neurological:      General: No focal deficit present  Mental Status: He is alert and oriented to person, place, and time  Psychiatric:         Mood and Affect: Mood normal          Behavior: Behavior normal          Thought Content:  Thought content normal          Judgment: Judgment normal                 Sanjay Mcknight MD  "

## 2023-06-19 ENCOUNTER — TELEPHONE (OUTPATIENT)
Dept: OBGYN CLINIC | Facility: OTHER | Age: 58
End: 2023-06-19

## 2023-06-19 DIAGNOSIS — M18.10 ARTHRITIS OF CARPOMETACARPAL (CMC) JOINT OF THUMB: Primary | ICD-10-CM

## 2023-06-19 NOTE — TELEPHONE ENCOUNTER
Patient is being referred to a orthopedics  Please schedule accordingly      [facility] [address]   [phone]

## 2023-06-29 ENCOUNTER — HOSPITAL ENCOUNTER (OUTPATIENT)
Dept: RADIOLOGY | Age: 58
Discharge: HOME | End: 2023-06-29
Payer: COMMERCIAL

## 2023-06-29 DIAGNOSIS — R97.20 ELEVATED PSA: ICD-10-CM

## 2023-06-29 PROCEDURE — 76377 3D RENDER W/INTRP POSTPROCES: CPT

## 2023-06-29 PROCEDURE — 72197 MRI PELVIS W/O & W/DYE: CPT

## 2023-06-29 PROCEDURE — G1004 CDSM NDSC: HCPCS

## 2023-06-29 PROCEDURE — A9585 GADOBUTROL INJECTION: HCPCS | Performed by: PHYSICIAN ASSISTANT

## 2023-06-29 RX ADMIN — GADOBUTROL 10 ML: 604.72 INJECTION INTRAVENOUS at 14:09

## 2023-07-20 ENCOUNTER — TELEPHONE (OUTPATIENT)
Dept: UROLOGY | Facility: AMBULATORY SURGERY CENTER | Age: 58
End: 2023-07-20

## 2023-07-20 NOTE — TELEPHONE ENCOUNTER
I spoke to the patient and scheduled his procedure for 9/13/2023 at [facility] with Dr. Truman Hardin.    -instructions given verbally and mailed  -patient aware to be NPO, needs a  and use an enema 1 hour prior to leaving the house morning of procedure  -CBC, CMP, Urine C&S and EKG 2 weeks prior  -PO/ZG 9/26/2023

## 2023-07-20 NOTE — TELEPHONE ENCOUNTER
Patient calling in requesting to speak with surgery scheduler. Patient requesting call back.      CB: [phone]

## 2023-08-30 ENCOUNTER — APPOINTMENT (OUTPATIENT)
Dept: LAB | Facility: MEDICAL CENTER | Age: 58
End: 2023-08-30
Payer: COMMERCIAL

## 2023-08-30 DIAGNOSIS — R97.20 ELEVATED PROSTATE SPECIFIC ANTIGEN (PSA): ICD-10-CM

## 2023-08-30 DIAGNOSIS — R97.20 ELEVATED PSA: ICD-10-CM

## 2023-08-30 LAB
ALBUMIN SERPL BCP-MCNC: 4 G/DL (ref 3.5–5)
ALP SERPL-CCNC: 65 U/L (ref 34–104)
ALT SERPL W P-5'-P-CCNC: 20 U/L (ref 7–52)
ANION GAP SERPL CALCULATED.3IONS-SCNC: 10 MMOL/L
AST SERPL W P-5'-P-CCNC: 21 U/L (ref 13–39)
BASOPHILS # BLD AUTO: 0.05 THOUSANDS/ÂΜL (ref 0–0.1)
BASOPHILS NFR BLD AUTO: 1 % (ref 0–1)
BILIRUB SERPL-MCNC: 0.4 MG/DL (ref 0.2–1)
BUN SERPL-MCNC: 28 MG/DL (ref 5–25)
CALCIUM SERPL-MCNC: 9 MG/DL (ref 8.4–10.2)
CHLORIDE SERPL-SCNC: 105 MMOL/L (ref 96–108)
CO2 SERPL-SCNC: 23 MMOL/L (ref 21–32)
CREAT SERPL-MCNC: 1.35 MG/DL (ref 0.6–1.3)
EOSINOPHIL # BLD AUTO: 0.16 THOUSAND/ÂΜL (ref 0–0.61)
EOSINOPHIL NFR BLD AUTO: 2 % (ref 0–6)
ERYTHROCYTE [DISTWIDTH] IN BLOOD BY AUTOMATED COUNT: 12.2 % (ref 11.6–15.1)
GFR SERPL CREATININE-BSD FRML MDRD: 57 ML/MIN/1.73SQ M
GLUCOSE P FAST SERPL-MCNC: 94 MG/DL (ref 65–99)
HCT VFR BLD AUTO: 41.4 % (ref 36.5–49.3)
HGB BLD-MCNC: 13.5 G/DL (ref 12–17)
IMM GRANULOCYTES # BLD AUTO: 0.02 THOUSAND/UL (ref 0–0.2)
IMM GRANULOCYTES NFR BLD AUTO: 0 % (ref 0–2)
LYMPHOCYTES # BLD AUTO: 1.95 THOUSANDS/ÂΜL (ref 0.6–4.47)
LYMPHOCYTES NFR BLD AUTO: 25 % (ref 14–44)
MCH RBC QN AUTO: 30.5 PG (ref 26.8–34.3)
MCHC RBC AUTO-ENTMCNC: 32.6 G/DL (ref 31.4–37.4)
MCV RBC AUTO: 94 FL (ref 82–98)
MONOCYTES # BLD AUTO: 0.53 THOUSAND/ÂΜL (ref 0.17–1.22)
MONOCYTES NFR BLD AUTO: 7 % (ref 4–12)
NEUTROPHILS # BLD AUTO: 4.99 THOUSANDS/ÂΜL (ref 1.85–7.62)
NEUTS SEG NFR BLD AUTO: 65 % (ref 43–75)
NRBC BLD AUTO-RTO: 0 /100 WBCS
PLATELET # BLD AUTO: 197 THOUSANDS/UL (ref 149–390)
PMV BLD AUTO: 10.3 FL (ref 8.9–12.7)
POTASSIUM SERPL-SCNC: 3.9 MMOL/L (ref 3.5–5.3)
PROT SERPL-MCNC: 6.2 G/DL (ref 6.4–8.4)
PSA SERPL-MCNC: 4.21 NG/ML (ref 0–4)
RBC # BLD AUTO: 4.43 MILLION/UL (ref 3.88–5.62)
SODIUM SERPL-SCNC: 138 MMOL/L (ref 135–147)
WBC # BLD AUTO: 7.7 THOUSAND/UL (ref 4.31–10.16)

## 2023-08-30 PROCEDURE — 36415 COLL VENOUS BLD VENIPUNCTURE: CPT

## 2023-08-30 PROCEDURE — 85025 COMPLETE CBC W/AUTO DIFF WBC: CPT

## 2023-08-30 PROCEDURE — 87086 URINE CULTURE/COLONY COUNT: CPT

## 2023-08-30 PROCEDURE — 80053 COMPREHEN METABOLIC PANEL: CPT

## 2023-08-30 PROCEDURE — 84153 ASSAY OF PSA TOTAL: CPT

## 2023-08-31 LAB — BACTERIA UR CULT: NORMAL

## 2023-09-06 NOTE — PRE-PROCEDURE INSTRUCTIONS
Pre-Surgery Instructions:   Medication Instructions   • Diclofenac Sodium (VOLTAREN) 1 % Stop taking 7 days prior to surgery. • multivitamin (THERAGRAN) TABS Stop taking 7 days prior to surgery. • sildenafil (VIAGRA) 50 MG tablet Stop taking 1 day prior to surgery. Medication instructions for day surgery reviewed. Please use only a sip of water to take your instructed medications. Avoid all over the counter vitamins, supplements and NSAIDS for one week prior to surgery per anesthesia guidelines. Tylenol is ok to take as needed. You will receive a call one business day prior to surgery with an arrival time and hospital directions. If your surgery is scheduled on a Monday, the hospital will be calling you on the Friday prior to your surgery. If you have not heard from anyone by 8pm, please call the hospital supervisor through the hospital  at [phone]. Do not eat or drink anything after midnight the night before your surgery, including candy, mints, lifesavers, or chewing gum. Do not drink alcohol 24hrs before your surgery. Try not to smoke at least 24hrs before your surgery. Follow the pre surgery showering instructions as listed in the [facility] Surgical Experience Booklet” or otherwise provided by your surgeon's office. Do not shave the surgical area 24 hours before surgery. Do not apply any lotions, creams, including makeup, cologne, deodorant, or perfumes after showering on the day of your surgery. No contact lenses, eye make-up, or artificial eyelashes. Remove nail polish, including gel polish, and any artificial, gel, or acrylic nails if possible. Remove all jewelry including rings and body piercing jewelry. Wear causal clothing that is easy to take on and off. Consider your type of surgery. Keep any valuables, jewelry, piercings at home. Please bring any specially ordered equipment (sling, braces) if indicated.     Arrange for a responsible person to drive you to and from the hospital on the day of your surgery. Visitor Guidelines discussed. Call the surgeon's office with any new illnesses, exposures, or additional questions prior to surgery. Please reference your [facility] Surgical Experience Booklet” for additional information to prepare for your upcoming surgery.

## 2023-09-12 RX ORDER — CEFAZOLIN SODIUM 2 G/50ML
2000 SOLUTION INTRAVENOUS
Status: COMPLETED | OUTPATIENT
Start: 2023-09-13 | End: 2023-09-13

## 2023-09-12 RX ORDER — CEFAZOLIN SODIUM 2 G/50ML
2000 SOLUTION INTRAVENOUS
Status: DISCONTINUED | OUTPATIENT
Start: 2023-09-13 | End: 2023-09-12 | Stop reason: SDUPTHER

## 2023-09-13 ENCOUNTER — ANESTHESIA (OUTPATIENT)
Dept: PERIOP | Facility: AMBULARY SURGERY CENTER | Age: 58
End: 2023-09-13
Payer: COMMERCIAL

## 2023-09-13 ENCOUNTER — ANESTHESIA EVENT (OUTPATIENT)
Dept: PERIOP | Facility: AMBULARY SURGERY CENTER | Age: 58
End: 2023-09-13
Payer: COMMERCIAL

## 2023-09-13 ENCOUNTER — HOSPITAL ENCOUNTER (OUTPATIENT)
Facility: AMBULARY SURGERY CENTER | Age: 58
Setting detail: OUTPATIENT SURGERY
Discharge: HOME | End: 2023-09-13
Attending: UROLOGY | Admitting: UROLOGY
Payer: COMMERCIAL

## 2023-09-13 VITALS
SYSTOLIC BLOOD PRESSURE: 118 MMHG | WEIGHT: 201 LBS | BODY MASS INDEX: 30.46 KG/M2 | RESPIRATION RATE: 16 BRPM | DIASTOLIC BLOOD PRESSURE: 71 MMHG | OXYGEN SATURATION: 97 % | HEART RATE: 69 BPM | TEMPERATURE: 97.7 F | HEIGHT: 68 IN

## 2023-09-13 DIAGNOSIS — R97.20 ELEVATED PROSTATE SPECIFIC ANTIGEN (PSA): ICD-10-CM

## 2023-09-13 PROCEDURE — NC001 PR NO CHARGE: Performed by: UROLOGY

## 2023-09-13 PROCEDURE — 76942 ECHO GUIDE FOR BIOPSY: CPT | Performed by: UROLOGY

## 2023-09-13 PROCEDURE — 55700 PR PROSTATE NEEDLE BIOPSY ANY APPROACH: CPT | Performed by: UROLOGY

## 2023-09-13 PROCEDURE — G0416 PROSTATE BIOPSY, ANY MTHD: HCPCS | Performed by: PATHOLOGY

## 2023-09-13 RX ORDER — OXYCODONE HYDROCHLORIDE 5 MG/1
5 TABLET ORAL EVERY 4 HOURS PRN
Status: DISCONTINUED | OUTPATIENT
Start: 2023-09-13 | End: 2023-09-13 | Stop reason: HOSPADM

## 2023-09-13 RX ORDER — PROPOFOL 10 MG/ML
INJECTION, EMULSION INTRAVENOUS AS NEEDED
Status: DISCONTINUED | OUTPATIENT
Start: 2023-09-13 | End: 2023-09-13

## 2023-09-13 RX ORDER — LIDOCAINE HYDROCHLORIDE 20 MG/ML
INJECTION, SOLUTION EPIDURAL; INFILTRATION; INTRACAUDAL; PERINEURAL AS NEEDED
Status: DISCONTINUED | OUTPATIENT
Start: 2023-09-13 | End: 2023-09-13 | Stop reason: HOSPADM

## 2023-09-13 RX ORDER — FENTANYL CITRATE 50 UG/ML
INJECTION, SOLUTION INTRAMUSCULAR; INTRAVENOUS AS NEEDED
Status: DISCONTINUED | OUTPATIENT
Start: 2023-09-13 | End: 2023-09-13

## 2023-09-13 RX ORDER — FENTANYL CITRATE/PF 50 MCG/ML
25 SYRINGE (ML) INJECTION
Status: CANCELLED | OUTPATIENT
Start: 2023-09-13

## 2023-09-13 RX ORDER — MIDAZOLAM HYDROCHLORIDE 2 MG/2ML
INJECTION, SOLUTION INTRAMUSCULAR; INTRAVENOUS AS NEEDED
Status: DISCONTINUED | OUTPATIENT
Start: 2023-09-13 | End: 2023-09-13

## 2023-09-13 RX ORDER — SODIUM CHLORIDE, SODIUM LACTATE, POTASSIUM CHLORIDE, CALCIUM CHLORIDE 600; 310; 30; 20 MG/100ML; MG/100ML; MG/100ML; MG/100ML
INJECTION, SOLUTION INTRAVENOUS CONTINUOUS PRN
Status: DISCONTINUED | OUTPATIENT
Start: 2023-09-13 | End: 2023-09-13

## 2023-09-13 RX ADMIN — PROPOFOL 50 MG: 10 INJECTION, EMULSION INTRAVENOUS at 08:53

## 2023-09-13 RX ADMIN — PROPOFOL 50 MG: 10 INJECTION, EMULSION INTRAVENOUS at 08:58

## 2023-09-13 RX ADMIN — PROPOFOL 50 MG: 10 INJECTION, EMULSION INTRAVENOUS at 08:50

## 2023-09-13 RX ADMIN — MIDAZOLAM 2 MG: 1 INJECTION INTRAMUSCULAR; INTRAVENOUS at 08:42

## 2023-09-13 RX ADMIN — FENTANYL CITRATE 50 MCG: 50 INJECTION INTRAMUSCULAR; INTRAVENOUS at 08:42

## 2023-09-13 RX ADMIN — SODIUM CHLORIDE, SODIUM LACTATE, POTASSIUM CHLORIDE, AND CALCIUM CHLORIDE: .6; .31; .03; .02 INJECTION, SOLUTION INTRAVENOUS at 08:44

## 2023-09-13 RX ADMIN — OXYCODONE HYDROCHLORIDE 5 MG: 5 TABLET ORAL at 10:08

## 2023-09-13 RX ADMIN — PROPOFOL 50 MG: 10 INJECTION, EMULSION INTRAVENOUS at 08:47

## 2023-09-13 RX ADMIN — CEFAZOLIN SODIUM 1000 MG: 2 SOLUTION INTRAVENOUS at 08:44

## 2023-09-13 RX ADMIN — FENTANYL CITRATE 50 MCG: 50 INJECTION INTRAMUSCULAR; INTRAVENOUS at 08:47

## 2023-09-13 NOTE — ANESTHESIA POSTPROCEDURE EVALUATION
Post-Op Assessment Note    CV Status:  Stable    Pain management: adequate     Mental Status:  Alert and awake   Hydration Status:  Euvolemic   PONV Controlled:  Controlled   Airway Patency:  Patent      Post Op Vitals Reviewed: Yes      Staff: CRNA, Anesthesiologist         No notable events documented.     BP   121/64   Temp  97   Pulse  78   Resp   15   SpO2   93

## 2023-09-13 NOTE — OP NOTE
OPERATIVE REPORT  PATIENT NAME: Natacha Benites    :  1965  MRN: [id number]  Pt Location: [facility] OR ROOM 05    SURGERY DATE: 2023    Surgeon(s) and Role:     Gaudencio Hayward MD - Primary    Preop Diagnosis:  Elevated prostate specific antigen (PSA) [R97.20]    Post-Op Diagnosis Codes:     * Elevated prostate specific antigen (PSA) [R97.20]    Procedure(s):  TRANSPERINEAL MRI FUSION BIOPSY PROSTATE    Specimen(s):  ID Type Source Tests Collected by Time Destination   1 : RIGHT ANTERIOR MEDIAL Tissue Prostate TISSUE EXAM Gaudencio Hayward MD 2023  8:34 AM    2 : RIGHT ANTERIOR LATERAL Tissue Prostate TISSUE EXAM Gaudencio Hayward MD 2023  8:34 AM    3 : RIGHT POSTERIOR LATERAL Tissue Prostate TISSUE EXAM Gaudencio Hayward MD 2023  8:34 AM    4 : LEFT ANTERIOR MEDIAL Tissue Prostate TISSUE EXAM Gaudencio Hayward MD 2023  8:34 AM    5 : LEFT ANTERIOR LATERAL Tissue Prostate TISSUE EXAM Gaudencio Hayward MD 2023  8:34 AM    6 : LEFT POSTERIOR LATERAL Tissue Prostate TISSUE EXAM Gaudencio Hayward MD 2023  8:34 AM    7 : LEFT POSTERIOR MEDIAL Tissue Prostate TISSUE EXAM Gaudencio Hayward MD 2023  8:34 AM    8 : LEFT BASE Tissue Prostate TISSUE EXAM Gaudencio Hayward MD 2023  8:34 AM    9 : RIGHT POSTERIOR MEDIAL Tissue Prostate TISSUE EXAM Gaudencio Hayward MD 2023  8:34 AM    10 : RIGHT BASE Tissue Prostate TISSUE EXAM Gaudencio Hayward MD 2023  8:34 AM    11 : Left posterior apex lesion Tissue Prostate TISSUE EXAM Gaudencio Hayward MD 2023  8:51 AM        Estimated Blood Loss:   Minimal    Drains:  * No LDAs found *    Anesthesia Type:   IV Sedation with Anesthesia    Operative Indications:  Elevated prostate specific antigen (PSA) [R97.20]      Procedure and Technique:  FINDINGS:  - A total of 14 samples are taken, in order to provide saturation sampling to maximize diagnostic sensitivity  - 4 samples are taken from TASHIA #1, which was labelled left posterior apex lesion        The patient is  is here for transperineal prostate saturation biopsy guided by biplanar transrectal ultrasound using the Precision Point Perineologic kit and using the uroNav device for MR fusion. The procedure, as well as the risks of bleeding, infection, and urinary retention have been explained he gives informed consent. The patient was brought to the operating room and identified properly. A time-out was performed. IV sedation was induced, and he was placed in the dorsal lithotomy position. The perineum shaved, and IO band was used to lift the scrotum away from the perineum. ChloraPrep was used to prep the perineum. Care was taken when placing the patient in the dorsal lithotomy position to make sure all pressure points were protected. .  The biplanar ultrasound probe was prepared with a condom and a 1 in wide piece of tape near the handle to place the precision Point kit on that., and I placed the Precision Point device with a clamp over the midportion of the ultrasound once near the handle over the tape. The aperture and guide needle were also prepared to be used later, and I placed the transrectal ultrasound probe into the rectum and visualized the prostate in sagittal and transverse views. This was used with a split screen. Transverse view was obtained, Volumetric sweep was performed for the purpose of fusing the MRI images with the ultrasound images. The perineum was anesthetized with 2% xylocaine 10 cc total both superficially and deep with a spinal needle on both sides of the median raphe. I then performed biopsies of the region of interest after placing the introducer needle into the perineum and took multiple biopsies with MR fusion guidance. Realtime ultrasound is used in addition to the Hilton verification of each target sampling.   Addition I verified on the back table that adequate sampling was obtained grossly from each specimen. Multiple samples were taken from the targeted lesion in order provide full saturation of the region of concern on MRI. I then performed standard transperineal prostate biopsy without MR guidance, taking the biopsies from the peripheral zone in the standard fashion of anterior medial anterior lateral and posterior medial posterior lateral and base region bilaterally. The top and second to top aperture settings were used to get the anterior zones, and the second to bottom aperture settings were used to obtain the posterior zones. These were all peripheral zone biopsies. Extra biopsies were taken in zones where the initial biopsy was suboptimal tissue. Care was taken to try to include the entire length of the prostate as much as possible for complete saturation coverage. Excellent prostate tissue cores were obtained, and specimens were sent to pathology. I withdrew the guide needle and I held pressure on the perineum for 1 minutes using gauze sponges. The perineum was then cleansed with sterile water.     Becky Majano MD          SIGNATURE: Becky Majano MD  DATE: September 13, 2023  TIME: 9:07 AM

## 2023-09-13 NOTE — ANESTHESIA PREPROCEDURE EVALUATION
Procedure:  TRANSPERINEAL MRI FUSION BIOPSY PROSTATE (Perineum)    Relevant Problems   ANESTHESIA (within normal limits)        Physical Exam    Airway    Mallampati score: II  TM Distance: >3 FB  Neck ROM: full     Dental   No notable dental hx     Cardiovascular  Rate: normal,     Pulmonary  Pulmonary exam normal     Other Findings        Anesthesia Plan  ASA Score- 3     Anesthesia Type- IV sedation with anesthesia with ASA Monitors. Additional Monitors:   Airway Plan:     Comment: Per patient, appropriately NPO, denies active CP/SOB/wheezing/symptoms related to heartburn/nausea/vomiting. Plan Factors-Exercise tolerance (METS): >4 METS. Chart reviewed. Patient summary reviewed. Patient is not a current smoker. Induction- intravenous. Postoperative Plan- Plan for postoperative opioid use. Informed Consent- Anesthetic plan and risks discussed with patient. I personally reviewed this patient with the CRNA. Discussed and agreed on the Anesthesia Plan with the CRNA. Fortunato Speaker

## 2023-09-13 NOTE — H&P
UROLOGY HISTORY AND PHYSICAL     Patient Identifiers: Jessie Gar (MRN [id number])      Date of Service: 2023        ASSESSMENT:     60 y.o. old male with  elevated PSA. PLAN:     Transperineal fusion biopsy of prostate      History of Present Illness:     Jessie Gar is a 60 y.o. old with a history of elevated PSA    Past Medical, Past Surgical History:   History reviewed. No pertinent past medical history.:    Past Surgical History:   Procedure Laterality Date   • HERNIA REPAIR     • PROSTATE BIOPSY Bilateral    :    Medications, Allergies:     Current Facility-Administered Medications:   •  ceFAZolin (ANCEF) IVPB (premix in dextrose) 2,000 mg 50 mL, 2,000 mg, Intravenous, On Call To OR, Abdirahman Nunn MD    Current Outpatient Medications:   •  Diclofenac Sodium (VOLTAREN) 1 %, as needed, Disp: , Rfl:   •  multivitamin (THERAGRAN) TABS, Take 1 tablet by mouth daily, Disp: , Rfl:   •  sildenafil (VIAGRA) 50 MG tablet, Take one tab one hour before desired affect., Disp: 10 tablet, Rfl: 0    Allergies:  No Known Allergies:    Social and Family History:   Social History:   Social History     Tobacco Use   • Smoking status: Former     Packs/day: 0.50     Years: 20.00     Total pack years: 10.00     Types: Cigarettes     Quit date:      Years since quittin.6   • Smokeless tobacco: Never   Vaping Use   • Vaping Use: Never used   Substance Use Topics   • Alcohol use: Yes     Comment: occasional   • Drug use: Never   . Social History     Tobacco Use   Smoking Status Former   • Packs/day: 0.50   • Years: 20.00   • Total pack years: 10.00   • Types: Cigarettes   • Quit date:    • Years since quittin.6   Smokeless Tobacco Never       Family History:  Family History   Problem Relation Age of Onset   • Cancer Mother    • No Known Problems Father    :     Review of Systems:     General: Fever, chills, or night sweats: negative  Cardiac: Negative for chest pain.     Pulmonary: Negative for shortness of breath. Gastrointestinal: Abdominal pain negative  Nausea, vomiting, or diarrhea negative  Genitourinary: See HPI above. Patient does nothave hematuria. All other systems queried were negative. Physical Exam:   General: Patient is pleasant and in NAD. Awake and alert  Ht 5' 8" (1.727 m)   Wt 95.3 kg (210 lb)   BMI 31.93 kg/m²   HEENT:  Normocephalic atraumatic  Cardiac:  Regular rate and rhythm, Peripheral edema: negative  Pulmonary: Non-labored breathing, CTAB  Abdomen: Soft, non-tender, non-distended. No surgical scars. No masses, tenderness, hernias noted. Genitourinary: negative CVA tenderness, neg suprapubic tenderness. Extremities: normal movement in all 4       Labs:     Lab Results   Component Value Date    HGB 13.5 08/30/2023    HCT 41.4 08/30/2023    WBC 7.70 08/30/2023     08/30/2023   ]    Lab Results   Component Value Date    K 3.9 08/30/2023     08/30/2023    CO2 23 08/30/2023    BUN 28 (H) 08/30/2023    CREATININE 1.35 (H) 08/30/2023    CALCIUM 9.0 08/30/2023   ]    Imaging:   I personally reviewed the images and report of the following studies, and reviewed them with the patient:        Thank you for allowing me to participate in this patients’ care. Please do not hesitate to call with any additional questions.   Hira Slater MD

## 2023-09-15 PROCEDURE — G0416 PROSTATE BIOPSY, ANY MTHD: HCPCS | Performed by: PATHOLOGY

## 2023-09-16 NOTE — RESULT ENCOUNTER NOTE
Please let the patient know the great news that their biopsy was negative for cancer! SHould followup as scheduled.   Thank you

## 2023-09-20 ENCOUNTER — TELEPHONE (OUTPATIENT)
Dept: UROLOGY | Facility: CLINIC | Age: 58
End: 2023-09-20

## 2023-09-25 NOTE — PROGRESS NOTES
Referring Physician: Matt Masters MD  A copy of this note was sent to the referring physician. Diagnoses and all orders for this visit:    Elevated PSA  -     PSA Total, Diagnostic; Future            Assessment and plan:       1. Elevated PSA status post negative prostate biopsy  -Transperineal MR fusion biopsy, September 13, 2023  -PSA at the time of negative biopsy 4.2 (prior PSA spike was up to 9.0)  - MRI prior to biopsy: 1 cm left peripheral apical lesion, ultimately biopsy-proven BPH nodule    #2 erectile dysfunction  -Poor response to sildenafil 50 mg  - We discussed some options, for now I have recommended titrating up to 100 mg as needed dosing. I was very happy to review the negative biopsy with the patient. He understands that he has no evidence of prostate cancer and that he will require continued prostate cancer screening. Non neoplastic etiologies for his elevated PSA were discussed such as underlying BPH or inflammation/prostatitis. The possibility of a false negative biopsy was also discussed. As such I recommended continued prostate cancer screening as the standard of care. He will follow up in 1 year with a PSA prior to the visit. If his PSA were to continue to rise above his current baseline, or demonstrated concerning PSA density or velocity we will evaluate further with a multiparametric MRI and/or subsequent consideration of transperineal/fusion biopsies as clinically dictated. Patient will follow-up in 1 year with advanced practitioner team with a repeat PSA or sooner if ever needed. Bhavin Hernadez MD      Chief Complaint     Biopsy follow-up      History of Present Illness     Jt Bui is a 60 y.o. returns in follow-up for elevated PSA    Detailed Urologic History     - please refer to HPI    Review of Systems     Review of Systems   Constitutional: Negative for activity change and fatigue. HENT: Negative for congestion.     Eyes: Negative for visual disturbance. Respiratory: Negative for shortness of breath and wheezing. Cardiovascular: Negative for chest pain and leg swelling. Gastrointestinal: Negative for abdominal pain. Endocrine: Negative for polyuria. Genitourinary: Negative for dysuria, flank pain, hematuria and urgency. Musculoskeletal: Negative for back pain. Allergic/Immunologic: Negative for immunocompromised state. Neurological: Negative for dizziness and numbness. Psychiatric/Behavioral: Negative for dysphoric mood. All other systems reviewed and are negative. Allergies     No Known Allergies    Physical Exam       Physical Exam  Constitutional:       General: He is not in acute distress. Appearance: He is well-developed. HENT:      Head: Normocephalic and atraumatic. Cardiovascular:      Comments: Negative lower extremity edema  Pulmonary:      Effort: Pulmonary effort is normal.      Breath sounds: Normal breath sounds. Abdominal:      Palpations: Abdomen is soft. Musculoskeletal:         General: Normal range of motion. Cervical back: Normal range of motion. Skin:     General: Skin is warm. Neurological:      Mental Status: He is alert and oriented to person, place, and time. Psychiatric:         Behavior: Behavior normal.           Vital Signs  There were no vitals filed for this visit. Current Medications       Current Outpatient Medications:   •  Diclofenac Sodium (VOLTAREN) 1 %, as needed, Disp: , Rfl:   •  multivitamin (THERAGRAN) TABS, Take 1 tablet by mouth daily, Disp: , Rfl:   •  sildenafil (VIAGRA) 50 MG tablet, Take one tab one hour before desired affect., Disp: 10 tablet, Rfl: 0      Active Problems     There is no problem list on file for this patient. Past Medical History     No past medical history on file.       Surgical History     Past Surgical History:   Procedure Laterality Date   • HERNIA REPAIR     • VA BX PROSTATE STRTCTC SATURATION SAMPLING IMG GID N/A 2023    Procedure: TRANSPERINEAL MRI FUSION BIOPSY PROSTATE;  Surgeon: Marcel Nolasco MD;  Location: [facility];  Service: Urology   • PROSTATE BIOPSY Bilateral          Family History     Family History   Problem Relation Age of Onset   • Cancer Mother    • No Known Problems Father          Social History     Social History     Social History     Tobacco Use   Smoking Status Former   • Packs/day: 0.50   • Years: 20.00   • Total pack years: 10.00   • Types: Cigarettes   • Quit date:    • Years since quittin.7   Smokeless Tobacco Never         Pertinent Lab Values     Lab Results   Component Value Date    CREATININE 1.35 (H) 2023       Lab Results   Component Value Date    PSA 4.21 (H) 2023    PSA 9.09 (H) 2023    PSA 4.8 (H) 2022       @RESULTRCNT(1H])@      Pertinent Imaging       No results found. Portions of the record may have been created with voice recognition software. Occasional wrong word or "sound a like" substitutions may have occurred due to the inherent limitations of voice recognition software. In addition some of the content generated from this outpatient encounter includes information designed for patient education and/or communication back to the referring provider. Read the chart carefully and recognize, using context, where substitutions have occurred.

## 2023-09-26 ENCOUNTER — OFFICE VISIT (OUTPATIENT)
Dept: UROLOGY | Facility: CLINIC | Age: 58
End: 2023-09-26
Payer: COMMERCIAL

## 2023-09-26 VITALS
BODY MASS INDEX: 31.98 KG/M2 | WEIGHT: 211 LBS | HEIGHT: 68 IN | SYSTOLIC BLOOD PRESSURE: 120 MMHG | HEART RATE: 87 BPM | DIASTOLIC BLOOD PRESSURE: 80 MMHG | OXYGEN SATURATION: 99 %

## 2023-09-26 DIAGNOSIS — R97.20 ELEVATED PSA: Primary | ICD-10-CM

## 2023-09-26 PROCEDURE — 99214 OFFICE O/P EST MOD 30 MIN: CPT | Performed by: UROLOGY

## 2023-10-19 ENCOUNTER — PREP FOR PROCEDURE (OUTPATIENT)
Age: 58
End: 2023-10-19

## 2023-10-19 ENCOUNTER — TELEPHONE (OUTPATIENT)
Age: 58
End: 2023-10-19

## 2023-10-19 DIAGNOSIS — Z86.010 HISTORY OF COLON POLYPS: Primary | ICD-10-CM

## 2023-10-19 NOTE — TELEPHONE ENCOUNTER
10/19/23  Screened by: Roger Osborne    Referring Provider     Pre- Screening: There is no height or weight on file to calculate BMI. Has patient been referred for a routine screening Colonoscopy? yes  Is the patient between 43-73 years old? yes      Previous Colonoscopy yes   If yes:    Date: 02/06/15    Facility: [facility]    Reason: Screening      SCHEDULING STAFF: If the patient is between 45yrs-49yrs, please advise patient to confirm benefits/coverage with their insurance company for a routine screening colonoscopy, some insurance carriers will only cover at [address] [place] [address] or older. If the patient is over 66years old, please schedule an office visit. Does the patient want to see a Gastroenterologist prior to their procedure OR are they having any GI symptoms? no    Has the patient been hospitalized or had abdominal surgery in the past 6 months? no    Does the patient use supplemental oxygen? no    Does the patient take Coumadin, Lovenox, Plavix, Elliquis, Xarelto, or other blood thinning medication? no    Has the patient had a stroke, cardiac event, or stent placed in the past year? no    SCHEDULING STAFF: If patient answers NO to above questions, then schedule procedure. If patient answers YES to above questions, then schedule office appointment. If patient is between 45yrs - 49yrs, please advise patient that we will have to confirm benefits & coverage with their insurance company for a routine screening colonoscopy.

## 2023-10-19 NOTE — TELEPHONE ENCOUNTER
Scheduled date of colonoscopy (as of today):11/8/2023  Physician performing colonoscopy:  Location of colonoscopy:[facility]  Bowel prep reviewed with patient: Ilana Reis  Instructions reviewed with patient by:Venus Goff  Clearances: N/A    Miralax dulcolax prep sent via [employer]

## 2023-10-24 ENCOUNTER — TELEPHONE (OUTPATIENT)
Dept: GASTROENTEROLOGY | Facility: CLINIC | Age: 58
End: 2023-10-24

## 2023-10-24 NOTE — TELEPHONE ENCOUNTER
Pt was scheduled on 11/10/23 with Dr. Gulshan Blair, however, he is no longer with the network. I called pt x2, phone rang and then went silent. Will call pt again tomorrow to try to reschedule.

## 2023-10-26 NOTE — TELEPHONE ENCOUNTER
I lmom informing pt that procedure on 11/10/23 had to be cancelled due to Dr Soto Beat no longer with the network and to please call back as we are trying to schedule within the same week. If pt calls back he can be scheduled on 11/7 or 11/8 with Dr. Britta Andrade at [facility]. Will call pt again in a few days if do not hear back from him.

## 2023-10-31 NOTE — TELEPHONE ENCOUNTER
Called pt trying to reschedule and the phone rang and then went silent x2. Will call pt again in a few days to try to reschedule.

## 2023-11-01 NOTE — TELEPHONE ENCOUNTER
Called pt again on cell phone and phone rang and then went silent. Called and lmoc for wife to please have pt call me back as I do now have openings with Dr Cinthia Beard on 11/10/23. Will call him again in a few days if do not hear back from him.

## 2024-03-21 ENCOUNTER — TELEPHONE (OUTPATIENT)
Age: 59
End: 2024-03-21

## 2024-03-21 NOTE — TELEPHONE ENCOUNTER
Scheduled date of colonoscopy (as of today): 5/13/24    Physician performing colonoscopy: Dr. Ríos    Location of colonoscopy:  [facility]    Bowel prep reviewed with patient: Miralax    Prep Instructions sent via [employer]

## 2024-03-21 NOTE — TELEPHONE ENCOUNTER
03/21/24  Screened by: Juani Gill    Referring Provider Dr. Harkins    Pre- Screening:     There is no height or weight on file to calculate BMI.  Has patient been referred for a routine screening Colonoscopy? yes  Is the patient between 45-75 years old? yes      Previous Colonoscopy yes   If yes:    Date: 10 years ago    Facility:     Reason:         Does the patient want to see a Gastroenterologist prior to their procedure OR are they having any GI symptoms? no    Has the patient been hospitalized or had abdominal surgery in the past 6 months? no    Does the patient use supplemental oxygen? no    Does the patient take Coumadin, Lovenox, Plavix, Elliquis, Xarelto, or other blood thinning medication? no    Has the patient had a stroke, cardiac event, or stent placed in the past year? no

## 2024-04-29 ENCOUNTER — ANESTHESIA (OUTPATIENT)
Dept: ANESTHESIOLOGY | Facility: HOSPITAL | Age: 59
End: 2024-04-29

## 2024-04-29 ENCOUNTER — ANESTHESIA EVENT (OUTPATIENT)
Dept: ANESTHESIOLOGY | Facility: HOSPITAL | Age: 59
End: 2024-04-29

## 2024-05-13 ENCOUNTER — ANESTHESIA (OUTPATIENT)
Dept: GASTROENTEROLOGY | Facility: AMBULARY SURGERY CENTER | Age: 59
End: 2024-05-13

## 2024-05-13 ENCOUNTER — HOSPITAL ENCOUNTER (OUTPATIENT)
Dept: GASTROENTEROLOGY | Facility: AMBULARY SURGERY CENTER | Age: 59
Setting detail: OUTPATIENT SURGERY
Discharge: HOME | End: 2024-05-13
Payer: COMMERCIAL

## 2024-05-13 ENCOUNTER — ANESTHESIA EVENT (OUTPATIENT)
Dept: GASTROENTEROLOGY | Facility: AMBULARY SURGERY CENTER | Age: 59
End: 2024-05-13

## 2024-05-13 VITALS
HEART RATE: 83 BPM | SYSTOLIC BLOOD PRESSURE: 110 MMHG | OXYGEN SATURATION: 99 % | RESPIRATION RATE: 18 BRPM | TEMPERATURE: 98.3 F | HEIGHT: 68 IN | WEIGHT: 198 LBS | BODY MASS INDEX: 30.01 KG/M2 | DIASTOLIC BLOOD PRESSURE: 66 MMHG

## 2024-05-13 DIAGNOSIS — Z86.010 HISTORY OF COLON POLYPS: ICD-10-CM

## 2024-05-13 PROCEDURE — 88305 TISSUE EXAM BY PATHOLOGIST: CPT | Performed by: PATHOLOGY

## 2024-05-13 PROCEDURE — 45390 COLONOSCOPY W/RESECTION: CPT | Performed by: INTERNAL MEDICINE

## 2024-05-13 PROCEDURE — 45385 COLONOSCOPY W/LESION REMOVAL: CPT | Performed by: INTERNAL MEDICINE

## 2024-05-13 RX ORDER — LIDOCAINE HYDROCHLORIDE 20 MG/ML
INJECTION, SOLUTION EPIDURAL; INFILTRATION; INTRACAUDAL; PERINEURAL AS NEEDED
Status: DISCONTINUED | OUTPATIENT
Start: 2024-05-13 | End: 2024-05-13

## 2024-05-13 RX ORDER — SODIUM CHLORIDE, SODIUM LACTATE, POTASSIUM CHLORIDE, CALCIUM CHLORIDE 600; 310; 30; 20 MG/100ML; MG/100ML; MG/100ML; MG/100ML
INJECTION, SOLUTION INTRAVENOUS CONTINUOUS PRN
Status: DISCONTINUED | OUTPATIENT
Start: 2024-05-13 | End: 2024-05-13

## 2024-05-13 RX ORDER — PROPOFOL 10 MG/ML
INJECTION, EMULSION INTRAVENOUS AS NEEDED
Status: DISCONTINUED | OUTPATIENT
Start: 2024-05-13 | End: 2024-05-13

## 2024-05-13 RX ADMIN — PROPOFOL 50 MG: 10 INJECTION, EMULSION INTRAVENOUS at 11:54

## 2024-05-13 RX ADMIN — SODIUM CHLORIDE, SODIUM LACTATE, POTASSIUM CHLORIDE, AND CALCIUM CHLORIDE: .6; .31; .03; .02 INJECTION, SOLUTION INTRAVENOUS at 11:30

## 2024-05-13 RX ADMIN — Medication 40 MG: at 11:50

## 2024-05-13 RX ADMIN — PROPOFOL 30 MG: 10 INJECTION, EMULSION INTRAVENOUS at 11:48

## 2024-05-13 RX ADMIN — PROPOFOL 40 MG: 10 INJECTION, EMULSION INTRAVENOUS at 11:58

## 2024-05-13 RX ADMIN — LIDOCAINE HYDROCHLORIDE 100 MG: 20 INJECTION, SOLUTION EPIDURAL; INFILTRATION; INTRACAUDAL at 11:45

## 2024-05-13 RX ADMIN — PROPOFOL 150 MG: 10 INJECTION, EMULSION INTRAVENOUS at 11:45

## 2024-05-13 RX ADMIN — PROPOFOL 30 MG: 10 INJECTION, EMULSION INTRAVENOUS at 11:51

## 2024-05-13 NOTE — ANESTHESIA POSTPROCEDURE EVALUATION
Post-Op Assessment Note    CV Status:  Stable  Pain Score: 0    Pain management: adequate       Mental Status:  Alert and awake   Hydration Status:  Euvolemic   PONV Controlled:  Controlled   Airway Patency:  Patent     Post Op Vitals Reviewed: Yes    No anethesia notable event occurred.    Staff: Anesthesiologist, CRNA               /54 (05/13/24 1207)    Temp 98.3 °F (36.8 °C) (05/13/24 1207)    Pulse 85 (05/13/24 1207)   Resp 15 (05/13/24 1207)    SpO2 97 % (05/13/24 1207)

## 2024-05-13 NOTE — ANESTHESIA PREPROCEDURE EVALUATION
Procedure:  COLONOSCOPY    Relevant Problems   No relevant active problems      No past medical history on file.  Lab Results   Component Value Date    WBC 7.70 08/30/2023    HGB 13.5 08/30/2023    HCT 41.4 08/30/2023    MCV 94 08/30/2023     08/30/2023       Physical Exam    Airway    Mallampati score: II  TM Distance: >3 FB  Neck ROM: full     Dental   No notable dental hx     Cardiovascular  Rhythm: regular, Rate: normal    Pulmonary   Breath sounds clear to auscultation    Other Findings  Intercisor Distance > 3cm          Anesthesia Plan  ASA Score- 2     Anesthesia Type- IV sedation with anesthesia with ASA Monitors.         Additional Monitors:     Airway Plan:     Comment: NPO appropriate. Discussed benefits/risks of monitored anesthetic care which involves providing a dynamic level of mild to deep sedation. Complications include awareness and/or airway obstruction/aspiration which may necessitate conversion to general anesthesia. All questions answered. Patient understands and wishes to proceed. .       Plan Factors-Exercise tolerance (METS): >4 METS.    Chart reviewed. EKG reviewed.  Existing labs reviewed.                   Induction-     Postoperative Plan- Plan for postoperative opioid use.     Informed Consent- Anesthetic plan and risks discussed with patient.  I personally reviewed this patient with the CRNA. Discussed and agreed on the Anesthesia Plan with the CRNA..

## 2024-05-13 NOTE — H&P
"History and Physical - [facility]  Vicente Bear 59 y.o. male MRN: [id number]                  HPI: Vicente Bear is a 59 y.o. year old male who presents for colonoscopy      REVIEW OF SYSTEMS: Per the HPI, and otherwise unremarkable.    Historical Information   Past Medical History:   Diagnosis Date    Colon polyp      Past Surgical History:   Procedure Laterality Date    HERNIA REPAIR      KS BX PROSTATE STRTCTC SATURATION SAMPLING IMG GID N/A 9/13/2023    Procedure: TRANSPERINEAL MRI FUSION BIOPSY PROSTATE;  Surgeon: Arron Salinas MD;  Location: [facility] MAIN OR;  Service: Urology    PROSTATE BIOPSY Bilateral      Social History   Social History     Substance and Sexual Activity   Alcohol Use Yes    Comment: occasional     Social History     Substance and Sexual Activity   Drug Use Never     Social History     Tobacco Use   Smoking Status Former    Current packs/day: 0.00    Average packs/day: 0.5 packs/day for 20.0 years (10.0 ttl pk-yrs)    Types: Cigarettes    Start date: 2001    Quit date: 2021    Years since quitting: 3.3   Smokeless Tobacco Never     Family History   Problem Relation Age of Onset    Cancer Mother     No Known Problems Father        Meds/Allergies       Current Outpatient Medications:     Diclofenac Sodium (VOLTAREN) 1 %    multivitamin (THERAGRAN) TABS    sildenafil (VIAGRA) 50 MG tablet    No Known Allergies    Objective     /71   Pulse 98   Temp (!) 97.1 °F (36.2 °C) (Temporal)   Resp 16   Ht 5' 8\" (1.727 m)   Wt 89.8 kg (198 lb)   SpO2 98%   BMI 30.11 kg/m²       PHYSICAL EXAM    Gen: NAD  Head: NCAT  CV: RRR  CHEST: Clear  ABD: soft, NT/ND  EXT: no edema      ASSESSMENT/PLAN:  This is a 59 y.o. year old male here for colonoscopy, and he is stable and optimized for his procedure.        "

## 2024-05-16 PROCEDURE — 88305 TISSUE EXAM BY PATHOLOGIST: CPT | Performed by: PATHOLOGY

## 2024-09-25 ENCOUNTER — TELEPHONE (OUTPATIENT)
Dept: UROLOGY | Facility: HOSPITAL | Age: 59
End: 2024-09-25

## 2024-09-25 DIAGNOSIS — R97.20 ELEVATED PSA: Primary | ICD-10-CM

## 2024-09-25 NOTE — TELEPHONE ENCOUNTER
Left message for patient asking if had psa lab work done possibly at a different lab for upcoming appointment

## 2024-10-04 ENCOUNTER — APPOINTMENT (OUTPATIENT)
Dept: LAB | Facility: MEDICAL CENTER | Age: 59
End: 2024-10-04
Payer: COMMERCIAL

## 2024-10-04 DIAGNOSIS — R97.20 ELEVATED PSA: ICD-10-CM

## 2024-10-04 LAB — PSA SERPL-MCNC: 10.43 NG/ML (ref 0–4)

## 2024-10-04 PROCEDURE — 84153 ASSAY OF PSA TOTAL: CPT

## 2024-10-04 PROCEDURE — 36415 COLL VENOUS BLD VENIPUNCTURE: CPT

## 2024-10-04 NOTE — TELEPHONE ENCOUNTER
Patient calling inquiring if he needs a PSA before his upcoming appointment. Informed he does and placed that lab order for him. He will be going to a [facility] lab later today to complete.    Confirmed upcoming appointment

## 2024-10-08 ENCOUNTER — OFFICE VISIT (OUTPATIENT)
Dept: UROLOGY | Facility: CLINIC | Age: 59
End: 2024-10-08
Payer: COMMERCIAL

## 2024-10-08 VITALS
DIASTOLIC BLOOD PRESSURE: 58 MMHG | HEART RATE: 89 BPM | BODY MASS INDEX: 30.46 KG/M2 | SYSTOLIC BLOOD PRESSURE: 110 MMHG | OXYGEN SATURATION: 99 % | WEIGHT: 201 LBS | HEIGHT: 68 IN

## 2024-10-08 DIAGNOSIS — R97.20 ELEVATED PSA: Primary | ICD-10-CM

## 2024-10-08 PROCEDURE — 99213 OFFICE O/P EST LOW 20 MIN: CPT

## 2024-10-08 NOTE — PROGRESS NOTES
10/8/2024      No chief complaint on file.        Assessment and Plan    59 y.o. male managed by Dr. Salinas    1.  Elevated PSA status post negative prostate biopsy  -Transperineal MRI fusion biopsy, 9/13/23  -PSA at the time of negative biopsy 4.2 (prior PSA spike was up to 9.0)  -MRI prior to biopsy (6/29/23): 1 cm left peripheral apical lesion, ultimately biopsy-proven BPH nodule  -Most recent PSA 10.434 (10/4/24)  -AR prostate is smooth, nontender. No nodules or asymmetry appreciated. About 50 g.  -We discussed that we will plan to repeat his PSA in 1 month, if it still remains elevated above his baseline we discussed repeating a multiparametric prostate at that time.     2. Erectile dysfunction  -Patient had poor response to sildenafil 100 mg. Patient expressed interest in trimix. I have faxed an RX over to [facility]. Patient will return in 1 month for trimix teaching.             History of Present Illness  Vicente Bear is a 59 y.o. male here for follow-up of elevated PSA and erectile dysfunction. His PSA is now 10.434. He states he does alternate at the gym between treadmill, biking, and rowing. We discussed how biking and rowing may falsely elevate his PSA so we will repeat it next month with a few days off from biking and rowing. He has no voiding complaints.    He previously was using sildenafil up to 100 mg without results. He expressed interest in trimix today. He will return for trimix teaching.        Review of Systems   Constitutional:  Negative for activity change, fatigue and fever.   HENT:  Negative for congestion, rhinorrhea and sore throat.    Eyes:  Negative for photophobia, redness and visual disturbance.   Respiratory:  Negative for cough, shortness of breath and wheezing.    Cardiovascular:  Negative for chest pain, palpitations and leg swelling.   Gastrointestinal:  Negative for abdominal pain, diarrhea, nausea and vomiting.   Genitourinary:  Negative for dysuria, flank pain,  frequency, hematuria and urgency.   Neurological:  Negative for weakness, light-headedness and headaches.           AUA SYMPTOM SCORE      Flowsheet Row Most Recent Value   AUA SYMPTOM SCORE    How often have you had a sensation of not emptying your bladder completely after you finished urinating? 1 (P)     How often have you had to urinate again less than two hours after you finished urinating? 1 (P)     How often have you found you stopped and started again several times when you urinate? 0 (P)     How often have you found it difficult to postpone urination? 0 (P)     How often have you had a weak urinary stream? 4 (P)     How often have you had to push or strain to begin urination? 0 (P)     How many times did you most typically get up to urinate from the time you went to bed at night until the time you got up in the morning? 5 (P)     Quality of Life: If you were to spend the rest of your life with your urinary condition just the way it is now, how would you feel about that? 2 (P)     AUA SYMPTOM SCORE 11 (P)               Vitals  There were no vitals filed for this visit.    Physical Exam  Constitutional:       Appearance: Normal appearance. He is not toxic-appearing.   HENT:      Head: Normocephalic.      Mouth/Throat:      Pharynx: Oropharynx is clear.   Eyes:      Extraocular Movements: Extraocular movements intact.      Pupils: Pupils are equal, round, and reactive to light.   Pulmonary:      Effort: Pulmonary effort is normal. No respiratory distress.   Genitourinary:     Comments: AR prostate is smooth, nontender. No nodules or asymmetry appreciated. About 50 g.  Musculoskeletal:         General: Normal range of motion.      Cervical back: Normal range of motion.   Neurological:      Mental Status: He is alert and oriented to person, place, and time. Mental status is at baseline.      Gait: Gait normal.   Psychiatric:         Mood and Affect: Mood normal.         Behavior: Behavior normal.          Thought Content: Thought content normal.         Judgment: Judgment normal.           Past History  Past Medical History:   Diagnosis Date    Colon polyp      Social History     Socioeconomic History    Marital status: /Civil Union     Spouse name: Not on file    Number of children: Not on file    Years of education: Not on file    Highest education level: Not on file   Occupational History    Not on file   Tobacco Use    Smoking status: Former     Current packs/day: 0.00     Average packs/day: 0.5 packs/day for 20.0 years (10.0 ttl pk-yrs)     Types: Cigarettes     Start date: 2001     Quit date: 2021     Years since quitting: 3.7    Smokeless tobacco: Never   Vaping Use    Vaping status: Never Used   Substance and Sexual Activity    Alcohol use: Yes     Comment: occasional    Drug use: Never    Sexual activity: Not on file   Other Topics Concern    Not on file   Social History Narrative    Not on file     Social Determinants of Health     Financial Resource Strain: Not on file   Food Insecurity: Not on file   Transportation Needs: Not on file   Physical Activity: Not on file   Stress: Not on file   Social Connections: Not on file   Intimate Partner Violence: Not on file   Housing Stability: Not on file     Social History     Tobacco Use   Smoking Status Former    Current packs/day: 0.00    Average packs/day: 0.5 packs/day for 20.0 years (10.0 ttl pk-yrs)    Types: Cigarettes    Start date: 2001    Quit date: 2021    Years since quitting: 3.7   Smokeless Tobacco Never     Family History   Problem Relation Age of Onset    Cancer Mother     No Known Problems Father        The following portions of the patient's history were reviewed and updated as appropriate: allergies, current medications, past medical history, past social history, past surgical history and problem list.    Results  No results found for this or any previous visit (from the past 1 hour(s)).]  Lab Results   Component Value Date    PSA 10.434  (H) 10/04/2024    PSA 4.21 (H) 08/30/2023    PSA 9.09 (H) 05/27/2023    PSA 4.8 (H) 09/03/2022     Lab Results   Component Value Date    CALCIUM 9.0 08/30/2023    K 3.9 08/30/2023    CO2 23 08/30/2023     08/30/2023    BUN 28 (H) 08/30/2023    CREATININE 1.35 (H) 08/30/2023     Lab Results   Component Value Date    WBC 7.70 08/30/2023    HGB 13.5 08/30/2023    HCT 41.4 08/30/2023    MCV 94 08/30/2023     08/30/2023       ANNIE Olson

## 2024-10-10 ENCOUNTER — TELEPHONE (OUTPATIENT)
Age: 59
End: 2024-10-10

## 2024-10-10 NOTE — TELEPHONE ENCOUNTER
[facility]  called the RX Refill Line. Message is being forwarded to the office.     Pharmacy is requesting clarification of the quanity on the prescription that was faxed to them today for trimix. Please review and contact the pharmacy     Please contact the pharmacy at [phone]

## 2024-10-26 ENCOUNTER — APPOINTMENT (OUTPATIENT)
Dept: LAB | Facility: MEDICAL CENTER | Age: 59
End: 2024-10-26
Payer: COMMERCIAL

## 2024-10-26 DIAGNOSIS — R97.20 ELEVATED PSA: ICD-10-CM

## 2024-10-26 LAB — PSA SERPL-MCNC: 5.2 NG/ML (ref 0–4)

## 2024-10-26 PROCEDURE — 84153 ASSAY OF PSA TOTAL: CPT

## 2024-10-26 PROCEDURE — 36415 COLL VENOUS BLD VENIPUNCTURE: CPT

## 2024-10-30 ENCOUNTER — PROCEDURE VISIT (OUTPATIENT)
Dept: UROLOGY | Facility: CLINIC | Age: 59
End: 2024-10-30
Payer: COMMERCIAL

## 2024-10-30 VITALS
SYSTOLIC BLOOD PRESSURE: 130 MMHG | OXYGEN SATURATION: 96 % | DIASTOLIC BLOOD PRESSURE: 76 MMHG | WEIGHT: 203 LBS | HEART RATE: 96 BPM | BODY MASS INDEX: 30.77 KG/M2 | HEIGHT: 68 IN

## 2024-10-30 DIAGNOSIS — R97.20 ELEVATED PSA: Primary | ICD-10-CM

## 2024-10-30 PROCEDURE — 99213 OFFICE O/P EST LOW 20 MIN: CPT

## 2024-10-30 NOTE — PROGRESS NOTES
10/30/2024      No chief complaint on file.        Assessment and Plan    59 y.o. male     Trimix Teaching  -patient did not bring Trimix. I called pharmacy to verify and they will have prescription ready for patient to .  -patient will return in a few weeks for Trimix teaching.    2. Elevated PSA  -PSA 5.203 (10/26/24). This has decreased from 10.434 (10/4/24)  -Transperineal MRI fusion biopsy, 9/13/23  -PSA at the time of negative biopsy 4.2 (prior PSA spike was up to 9.0)  -MRI prior to biopsy (6/29/23): 1 cm left peripheral apical lesion, ultimately biopsy-proven BPH nodule  -Normal AR  -We will plan to repeat patient's PSA in 6 months.     History of Present Illness  Vicente Bear is a 59 y.o. male here with history of erectile dysfunction and elevated PSA.         Review of Systems   Constitutional:  Negative for activity change, chills, fatigue and fever.   HENT:  Negative for congestion, rhinorrhea and sore throat.    Eyes:  Negative for photophobia, redness and visual disturbance.   Respiratory:  Negative for cough, shortness of breath and wheezing.    Cardiovascular:  Negative for chest pain, palpitations and leg swelling.   Gastrointestinal:  Negative for abdominal pain, diarrhea, nausea and vomiting.   Genitourinary:  Negative for dysuria, flank pain, frequency, hematuria and urgency.   Neurological:  Negative for weakness, light-headedness and headaches.           AUA SYMPTOM SCORE      Flowsheet Row Most Recent Value   AUA SYMPTOM SCORE    How often have you had a sensation of not emptying your bladder completely after you finished urinating? 0 (P)     How often have you had to urinate again less than two hours after you finished urinating? 1 (P)     How often have you found you stopped and started again several times when you urinate? 1 (P)     How often have you found it difficult to postpone urination? 0 (P)     How often have you had a weak urinary stream? 1 (P)     How often have you had  "to push or strain to begin urination? 0 (P)     How many times did you most typically get up to urinate from the time you went to bed at night until the time you got up in the morning? 1 (P)     Quality of Life: If you were to spend the rest of your life with your urinary condition just the way it is now, how would you feel about that? 1 (P)     AUA SYMPTOM SCORE 4 (P)               Vitals  Vitals:    10/30/24 1050   BP: 130/76   BP Location: Left arm   Patient Position: Sitting   Cuff Size: Standard   Pulse: 96   SpO2: 96%   Weight: 92.1 kg (203 lb)   Height: 5' 8\" (1.727 m)       Physical Exam  Constitutional:       Appearance: Normal appearance. He is not toxic-appearing.   HENT:      Head: Normocephalic.      Mouth/Throat:      Pharynx: Oropharynx is clear.   Eyes:      Extraocular Movements: Extraocular movements intact.      Pupils: Pupils are equal, round, and reactive to light.   Pulmonary:      Effort: Pulmonary effort is normal. No respiratory distress.   Musculoskeletal:         General: Normal range of motion.      Cervical back: Normal range of motion.   Neurological:      Mental Status: He is alert and oriented to person, place, and time. Mental status is at baseline.      Gait: Gait normal.   Psychiatric:         Mood and Affect: Mood normal.         Behavior: Behavior normal.         Thought Content: Thought content normal.         Judgment: Judgment normal.           Past History  Past Medical History:   Diagnosis Date    Colon polyp      Social History     Socioeconomic History    Marital status: /Civil Union     Spouse name: None    Number of children: None    Years of education: None    Highest education level: None   Occupational History    None   Tobacco Use    Smoking status: Former     Current packs/day: 0.00     Average packs/day: 0.5 packs/day for 20.0 years (10.0 ttl pk-yrs)     Types: Cigarettes     Start date: 2001     Quit date: 2021     Years since quitting: 3.8    Smokeless " tobacco: Never   Vaping Use    Vaping status: Never Used   Substance and Sexual Activity    Alcohol use: Yes     Comment: occasional    Drug use: Never    Sexual activity: None   Other Topics Concern    None   Social History Narrative    None     Social Determinants of Health     Financial Resource Strain: Not on file   Food Insecurity: Not on file   Transportation Needs: Not on file   Physical Activity: Not on file   Stress: Not on file   Social Connections: Not on file   Intimate Partner Violence: Not on file   Housing Stability: Not on file     Social History     Tobacco Use   Smoking Status Former    Current packs/day: 0.00    Average packs/day: 0.5 packs/day for 20.0 years (10.0 ttl pk-yrs)    Types: Cigarettes    Start date: 2001    Quit date: 2021    Years since quitting: 3.8   Smokeless Tobacco Never     Family History   Problem Relation Age of Onset    Cancer Mother     No Known Problems Father        The following portions of the patient's history were reviewed and updated as appropriate: allergies, current medications, past medical history, past social history, past surgical history and problem list.    Results  No results found for this or any previous visit (from the past 1 hour(s)).]  Lab Results   Component Value Date    PSA 5.203 (H) 10/26/2024    PSA 10.434 (H) 10/04/2024    PSA 4.21 (H) 08/30/2023    PSA 9.09 (H) 05/27/2023     Lab Results   Component Value Date    CALCIUM 9.0 08/30/2023    K 3.9 08/30/2023    CO2 23 08/30/2023     08/30/2023    BUN 28 (H) 08/30/2023    CREATININE 1.35 (H) 08/30/2023     Lab Results   Component Value Date    WBC 7.70 08/30/2023    HGB 13.5 08/30/2023    HCT 41.4 08/30/2023    MCV 94 08/30/2023     08/30/2023       ANNIE Olson

## 2024-11-13 ENCOUNTER — PROCEDURE VISIT (OUTPATIENT)
Dept: UROLOGY | Facility: CLINIC | Age: 59
End: 2024-11-13
Payer: COMMERCIAL

## 2024-11-13 VITALS
DIASTOLIC BLOOD PRESSURE: 80 MMHG | SYSTOLIC BLOOD PRESSURE: 128 MMHG | HEIGHT: 68 IN | WEIGHT: 203 LBS | BODY MASS INDEX: 30.77 KG/M2

## 2024-11-13 DIAGNOSIS — N52.9 ERECTILE DYSFUNCTION, UNSPECIFIED ERECTILE DYSFUNCTION TYPE: Primary | ICD-10-CM

## 2024-11-13 PROCEDURE — 99213 OFFICE O/P EST LOW 20 MIN: CPT

## 2024-11-13 NOTE — PROGRESS NOTES
"11/13/2024  Vicetne Bear  1965  [id number]      Assessment/Plan  Erectile dysfunction  Initiation of Tri Mix [10/30/1] injections at 0.1mL dose    Discussion  Vicente Bear is a 59 y.o. being managed by Dr. Salinas. He was provided verbal, written, and physical demonstration of intracavernosal injection use. He was instructed on storage, disposal, and titration of the medication. He was instructed on hospital precautions for a priapism and use of home pseudoephedrine.  He is instructed not to use more than 2-3 doses per week, alternate left/right injection sites with each use to prevent scarring. He was able to demonstrate understanding of injection use. All questions were answered.    Patient with NO history of sickle cell trait/disease, thrombocytopenia, cocaine or trazodone use. Has trialed various PDE5i either without sufficient benefit or with adverse side effects for which local therapy has been recommended instead.    History of Present Illness  52 y.o. male with ED presents today for intracavernosal injection teaching.    /80 (BP Location: Left arm, Patient Position: Sitting, Cuff Size: Adult)   Ht 5' 8\" (1.727 m)   Wt 92.1 kg (203 lb)   BMI 30.87 kg/m²       Procedure    Procedure: intracavernosal injection  Indication: Erectile Rehabilitation  unknown cause  Discussed:. Proper technique and instruction for intracavernosal injection were explained to the patient. risks of injection including but not limited to pain, bleeding, infection, fibrosis, and priapism (including the potential complications of priapism) were discussed.   Procedure: The skin overlying the injection site RIGHT side was then prepped with Alcohol. 0.1 ml of medication was injected into the corpora cavernosum.   Patient Status:. the patient was closely monitored for 10 minutes and reassessed. He tolerated the procedure well. Response to intracavernosal injection was fair  Complications:. No complications noted. "   Instructions:. the patient was counseled about priapism and instructed to return to the clinic or the emergency room if his erection lasted up to 4 hours. The patient expressed understanding of the injection technique and felt able to perform self-injection.

## 2024-12-28 ENCOUNTER — APPOINTMENT (OUTPATIENT)
Dept: RADIOLOGY | Facility: MEDICAL CENTER | Age: 59
End: 2024-12-28
Attending: PHYSICIAN ASSISTANT
Payer: COMMERCIAL

## 2024-12-28 ENCOUNTER — OFFICE VISIT (OUTPATIENT)
Dept: URGENT CARE | Facility: MEDICAL CENTER | Age: 59
End: 2024-12-28
Payer: COMMERCIAL

## 2024-12-28 VITALS
BODY MASS INDEX: 30.77 KG/M2 | RESPIRATION RATE: 18 BRPM | HEIGHT: 68 IN | HEART RATE: 109 BPM | TEMPERATURE: 98.9 F | OXYGEN SATURATION: 98 % | DIASTOLIC BLOOD PRESSURE: 85 MMHG | SYSTOLIC BLOOD PRESSURE: 121 MMHG | WEIGHT: 203 LBS

## 2024-12-28 DIAGNOSIS — R05.3 PERSISTENT COUGH: Primary | ICD-10-CM

## 2024-12-28 DIAGNOSIS — R05.3 PERSISTENT COUGH: ICD-10-CM

## 2024-12-28 PROCEDURE — 71046 X-RAY EXAM CHEST 2 VIEWS: CPT

## 2024-12-28 PROCEDURE — 99213 OFFICE O/P EST LOW 20 MIN: CPT | Performed by: PHYSICIAN ASSISTANT

## 2024-12-28 RX ORDER — AZITHROMYCIN 250 MG/1
250 TABLET, FILM COATED ORAL EVERY 24 HOURS
Qty: 6 TABLET | Refills: 0 | Status: SHIPPED | OUTPATIENT
Start: 2024-12-28 | End: 2025-01-02

## 2024-12-28 NOTE — PATIENT INSTRUCTIONS
Increase fluids  Take Zithromax 2 tablets today, then 1 days 2-5  Follow-up with PCP if symptoms worsen or persist.

## 2024-12-28 NOTE — PROGRESS NOTES
[facility]        NAME: Vicente Bear is a 59 y.o. male  : 1965    MRN: [id number]  DATE: 2024  TIME: 2:08 PM    Assessment and Plan   Persistent cough [R05.3]  1. Persistent cough  XR chest pa and lateral    azithromycin (ZITHROMAX) 250 mg tablet            Patient Instructions     Increase fluids  Take Zithromax 2 tablets today, then 1 days 2-5  Follow-up with PCP if symptoms worsen or persist.       If tests have been performed at [facility], our office will contact you with results if changes need to be made to the care plan discussed with you at the visit.  You can review your full results on [facility].    Chief Complaint     Chief Complaint   Patient presents with    Cold Like Symptoms     Patient states for a few weeks he has had worsening cough, chest congestion, post nasal drip; worse at night          History of Present Illness       Vicente is a 59-year-old male who presents with a 3-week history of persistent cough.  Patient reports his initial symptoms started with nasal congestion, runny nose and a slight cough.  His nasal symptoms have improved but the cough has persisted and worsened.  Patient denies any chest pain or shortness of breath.  He reports no fever, chills or bodyaches since the onset of his symptoms.        Review of Systems   Review of Systems   Constitutional: Negative.    HENT:  Positive for congestion.    Respiratory:  Positive for cough. Negative for chest tightness and shortness of breath.    Cardiovascular: Negative.    Gastrointestinal: Negative.          Current Medications       Current Outpatient Medications:     azithromycin (ZITHROMAX) 250 mg tablet, Take 1 tablet (250 mg total) by mouth every 24 hours for 5 days 2 day 1, then 1 days 2-5 (z-pack), Disp: 6 tablet, Rfl: 0    Diclofenac Sodium (VOLTAREN) 1 %, as needed, Disp: , Rfl:     multivitamin (THERAGRAN) TABS, Take 1 tablet by mouth daily, Disp: , Rfl:     Current Allergies     Allergies  "as of 12/28/2024    (No Known Allergies)            The following portions of the patient's history were reviewed and updated as appropriate: allergies, current medications, past family history, past medical history, past social history, past surgical history and problem list.     Past Medical History:   Diagnosis Date    Colon polyp        Past Surgical History:   Procedure Laterality Date    HERNIA REPAIR      MD BX PROSTATE STRTCTC SATURATION SAMPLING IMG GID N/A 9/13/2023    Procedure: TRANSPERINEAL MRI FUSION BIOPSY PROSTATE;  Surgeon: Arron Salinas MD;  Location: [facility] MAIN OR;  Service: Urology    PROSTATE BIOPSY Bilateral        Family History   Problem Relation Age of Onset    Cancer Mother     No Known Problems Father          Medications have been verified.        Objective   /85   Pulse (!) 109   Temp 98.9 °F (37.2 °C) (Temporal)   Resp 18   Ht 5' 8\" (1.727 m)   Wt 92.1 kg (203 lb)   SpO2 98%   BMI 30.87 kg/m²   No LMP for male patient.       Physical Exam     Physical Exam  Constitutional:       General: He is not in acute distress.     Appearance: Normal appearance. He is not ill-appearing.   HENT:      Head: Normocephalic and atraumatic.      Right Ear: Tympanic membrane and ear canal normal.      Left Ear: Tympanic membrane and ear canal normal.      Nose: Nose normal.      Mouth/Throat:      Lips: Pink.      Pharynx: Oropharynx is clear.   Cardiovascular:      Rate and Rhythm: Normal rate and regular rhythm.      Heart sounds: Normal heart sounds, S1 normal and S2 normal. No murmur heard.  Pulmonary:      Effort: Pulmonary effort is normal.      Breath sounds: Normal air entry. No wheezing, rhonchi or rales.   Neurological:      Mental Status: He is alert.         Initial review of chest x-ray revealed no acute consolidations or visible pneumonia.  Final results as per radiology review.  Discussed results with patient, concern for possible mycoplasma infection.          "

## 2025-04-23 ENCOUNTER — TELEPHONE (OUTPATIENT)
Dept: GASTROENTEROLOGY | Facility: CLINIC | Age: 60
End: 2025-04-23

## 2025-04-23 NOTE — TELEPHONE ENCOUNTER
Called and left a message for the patient to call back to schedule 1 year repeat colonoscopy from recall with Dr Ríos. Sent a letter via myc

## 2025-05-27 ENCOUNTER — ANCILLARY ORDERS (OUTPATIENT)
Dept: FAMILY MEDICINE CLINIC | Facility: CLINIC | Age: 60
End: 2025-05-27

## 2025-05-27 ENCOUNTER — OFFICE VISIT (OUTPATIENT)
Dept: FAMILY MEDICINE CLINIC | Facility: CLINIC | Age: 60
End: 2025-05-27
Payer: COMMERCIAL

## 2025-05-27 ENCOUNTER — APPOINTMENT (OUTPATIENT)
Dept: RADIOLOGY | Facility: MEDICAL CENTER | Age: 60
End: 2025-05-27
Payer: COMMERCIAL

## 2025-05-27 VITALS
WEIGHT: 198 LBS | BODY MASS INDEX: 30.01 KG/M2 | DIASTOLIC BLOOD PRESSURE: 82 MMHG | SYSTOLIC BLOOD PRESSURE: 116 MMHG | TEMPERATURE: 98.1 F | OXYGEN SATURATION: 98 % | HEART RATE: 90 BPM | HEIGHT: 68 IN

## 2025-05-27 DIAGNOSIS — M25.512 ACUTE PAIN OF LEFT SHOULDER: Primary | ICD-10-CM

## 2025-05-27 DIAGNOSIS — M25.512 ACUTE PAIN OF LEFT SHOULDER: ICD-10-CM

## 2025-05-27 PROCEDURE — 99213 OFFICE O/P EST LOW 20 MIN: CPT | Performed by: INTERNAL MEDICINE

## 2025-05-27 PROCEDURE — 73030 X-RAY EXAM OF SHOULDER: CPT

## 2025-05-27 NOTE — ASSESSMENT & PLAN NOTE
Patient is complaining over the past month of having increasing pain with movement of his left shoulder and some crepitation.  He does not take like taking too many nonsteroidals we will get an x-ray and refer him to physical therapy and orthopedics  Orders:    XR shoulder 2+ vw right; Future    Ambulatory Referral to Orthopedic Surgery; Future    Ambulatory Referral to Physical Therapy; Future

## 2025-05-27 NOTE — PROGRESS NOTES
Name: Vicente Bear      : 1965      MRN: [id number]  Encounter Provider: Yoselyn De Anda MD  Encounter Date: 2025   Encounter department: [facility]  :  Assessment & Plan  Acute pain of left shoulder  Patient is complaining over the past month of having increasing pain with movement of his left shoulder and some crepitation.  He does not take like taking too many nonsteroidals we will get an x-ray and refer him to physical therapy and orthopedics  Orders:    XR shoulder 2+ vw right; Future    Ambulatory Referral to Orthopedic Surgery; Future    Ambulatory Referral to Physical Therapy; Future          Depression Screening and Follow-up Plan: Patient was screened for depression during today's encounter. They screened negative with a PHQ-2 score of 0.        History of Present Illness   Shoulder Pain   The pain is present in the left shoulder. This is a new problem. The current episode started 1 to 4 weeks ago. There has been no history of extremity trauma. The problem occurs daily. The problem has been unchanged. The quality of the pain is described as aching. The pain is at a severity of 7/10. The pain is moderate. Associated symptoms include stiffness. Pertinent negatives include no fever, inability to bear weight, itching, joint locking, joint swelling, limited range of motion or numbness. Associated symptoms comments: Pos crepitation. The symptoms are aggravated by activity. He has tried acetaminophen for the symptoms. The treatment provided mild relief. Family history does not include gout or rheumatoid arthritis. There is no history of diabetes, gout or rheumatoid arthritis.     Review of Systems   Constitutional:  Negative for chills and fever.   HENT:  Negative for ear pain and sore throat.    Eyes:  Negative for pain and visual disturbance.   Respiratory:  Negative for cough and shortness of breath.    Cardiovascular:  Negative for chest pain and palpitations.   Gastrointestinal:   "Negative for abdominal pain and vomiting.   Genitourinary:  Negative for dysuria and hematuria.   Musculoskeletal:  Positive for stiffness. Negative for arthralgias, back pain and gout.        Application of left shoulder with movement and mild pain   Skin:  Negative for color change, itching and rash.   Neurological:  Negative for seizures, syncope and numbness.   All other systems reviewed and are negative.      Objective   /82 (BP Location: Right arm, Patient Position: Sitting, Cuff Size: Large)   Pulse 90   Temp 98.1 °F (36.7 °C) (Temporal)   Ht 5' 8\" (1.727 m)   Wt 89.8 kg (198 lb)   SpO2 98%   BMI 30.11 kg/m²      Physical Exam  Vitals and nursing note reviewed.   Constitutional:       General: He is not in acute distress.     Appearance: He is well-developed.   HENT:      Head: Normocephalic and atraumatic.     Eyes:      Conjunctiva/sclera: Conjunctivae normal.       Cardiovascular:      Rate and Rhythm: Normal rate and regular rhythm.      Heart sounds: No murmur heard.  Pulmonary:      Effort: Pulmonary effort is normal. No respiratory distress.      Breath sounds: Normal breath sounds.   Abdominal:      Palpations: Abdomen is soft.      Tenderness: There is no abdominal tenderness.     Musculoskeletal:         General: No swelling, tenderness, deformity or signs of injury.      Cervical back: Neck supple.      Comments: Crepitation and tenderness of right shoulder     Skin:     General: Skin is warm and dry.      Capillary Refill: Capillary refill takes less than 2 seconds.     Neurological:      General: No focal deficit present.      Mental Status: He is alert and oriented to person, place, and time.     Psychiatric:         Mood and Affect: Mood normal.         "

## 2025-05-28 ENCOUNTER — RESULTS FOLLOW-UP (OUTPATIENT)
Dept: FAMILY MEDICINE CLINIC | Facility: CLINIC | Age: 60
End: 2025-05-28

## 2025-05-29 ENCOUNTER — OFFICE VISIT (OUTPATIENT)
Dept: OBGYN CLINIC | Facility: MEDICAL CENTER | Age: 60
End: 2025-05-29
Payer: COMMERCIAL

## 2025-05-29 VITALS — WEIGHT: 196 LBS | BODY MASS INDEX: 29.8 KG/M2

## 2025-05-29 DIAGNOSIS — S43.102A AC SEPARATION, LEFT, INITIAL ENCOUNTER: ICD-10-CM

## 2025-05-29 DIAGNOSIS — M75.42 IMPINGEMENT SYNDROME OF LEFT SHOULDER: Primary | ICD-10-CM

## 2025-05-29 PROCEDURE — 99203 OFFICE O/P NEW LOW 30 MIN: CPT | Performed by: STUDENT IN AN ORGANIZED HEALTH CARE EDUCATION/TRAINING PROGRAM

## 2025-05-29 RX ORDER — NAPROXEN 500 MG/1
500 TABLET ORAL 2 TIMES DAILY WITH MEALS
Qty: 60 TABLET | Refills: 0 | Status: SHIPPED | OUTPATIENT
Start: 2025-05-29

## 2025-05-29 NOTE — PROGRESS NOTES
ASSESSMENT/PLAN:    Assessment & Plan  Impingement syndrome of left shoulder  Discussed history, exam, and imaging with patient. Presentation most consistent with left shoulder impingement and chronic AC separation and we will plan for nonoperative management at this time.  Recommend activity modifications especially in the gym, with avoidance of heavy pressing movements.  Discussed oral/topical medication regimen. Will plan for oral naproxen and Tylenol as needed.  Naproxen ordered at today's visit.  Discussed that he should take with food and stay hydrated throughout the day.  Discussed injections as a pain adjunct. Discussed corticosteroid injection to the subacromial space which patient defers at today's visit.  Discussed rehabilitation efforts. Plan for physical therapy for periscapular strengthening.  Order placed at today's visit.  Shoulder rehabilitation program was also provided in patient's after visit summary.  Discussed advanced imaging in the form of MRI.  Discussed that MRI is not currently indicated based on history and exam, but can be considered if no improvement in symptoms is demonstrated with conservative treatment.   AC separation, left, initial encounter  Discussed history, exam, and imaging with patient. Presentation most consistent with left shoulder impingement and chronic AC separation and we will plan for nonoperative management at this time.  Recommend activity modifications especially in the gym, with avoidance of heavy pressing movements.  Discussed oral/topical medication regimen. Will plan for oral naproxen and Tylenol as needed.  Naproxen ordered at today's visit.  Discussed that he should take with food and stay hydrated throughout the day.  Discussed injections as a pain adjunct. Discussed corticosteroid injection to the subacromial space which patient defers at today's visit.  Discussed rehabilitation efforts. Plan for physical therapy for periscapular strengthening.  Order placed  "at today's visit.  Shoulder rehabilitation program was also provided in patient's after visit summary.  Discussed advanced imaging in the form of MRI.  Discussed that MRI is not currently indicated based on history and exam, but can be considered if no improvement in symptoms is demonstrated with conservative treatment.          Return if symptoms worsen or fail to improve.   _____________________________________________________  CHIEF COMPLAINT:  Chief Complaint   Patient presents with    Left Shoulder - Pain     Started hurting about a month ago after working out at the gym.       SUBJECTIVE:  Vicente Bear is a 60 y.o. year old male, who presents for evaluation of left shoulder pain with referral from Dr De Anda. The issue began approximately 1 month ago without new injury. He does not that he injured the shoulder 15 years ago when he fell directly on the left shoulder. He saw a doctor as was told he had a \"slight dislocation\" which he describes as an AC separation. He was treated nonoperatively. He notes he was doing well up until 1 month ago. He notes pain worsens after he performs pushing movements in the gym, and after doing yard work outside with repetitive motions of the left upper extremity. He rates pain rated 4/10 in severity at today's visit. He has not been taking anything for this yet, had injections or had formal physical therapy.     PAST MEDICAL HISTORY:  Past Medical History[1]    PAST SURGICAL HISTORY:  Past Surgical History[2]    FAMILY HISTORY:  Family History[3]    SOCIAL HISTORY:  Social History[4]    MEDICATIONS:  Current Medications[5]    ALLERGIES:  Allergies[6]    Review of systems:   Constitutional: Negative for fatigue, fever or loss of apetite.   HENT: Negative.    Respiratory: Negative for shortness of breath, dyspnea.    Cardiovascular: Negative for chest pain/tightness.   Gastrointestinal: Negative for abdominal pain, N/V.   Endocrine: Negative for cold/heat intolerance, unexplained " "weight loss/gain.   Genitourinary: Negative for flank pain, dysuria, hematuria.   Musculoskeletal: As in HPI  Skin: Negative for rash.    Neurological: As in HPI  Psychiatric/Behavioral: Negative for agitation.  _____________________________________________________  PHYSICAL EXAMINATION:    Weight 88.9 kg (196 lb).    General: well developed, well nourished\", alert and oriented times 3, no acute distress  HEENT: Benign, normocephalic, atraumatic  Cardiovascular: regular rate    Pulmonary: No wheezing or stridor  Abdomen: Soft, Nontender  Skin: No open wounds, erythema, rash  Neurovascular: per examination below    MUSCULOSKELETAL EXAMINATION:    Left shoulder exam  No bruising, swelling  Mild deformity appreciated at AC joint  NonTTP cervical spine, clavicle, AC joint, acromion, scapular spine, posterior joint line, deltoid, anterior joint line, bicipital groove  Active ROM  Forward flexion: 170  External rotation in adduction: 70 bilaterally  Internal rotation: mid thoracic  Strength Testin/5 with Juan José  4/5 ER in adduction  Provocative maneuvers:   +: N/A  -: Neer impingement, drop arm, belly press, lift off, speed's, barron's, Hornblower's  SILT C5-T1  2+ Radial pulse, symmetric with contralateral        _____________________________________________________  STUDIES REVIEWED:  I have personally reviewed pertinent films in PACS and my interpretation is:     Xrays of the left shoulder taken on 2025 demonstrate acromial clavicular joint separation with elevation of the distal clavicle relative to the acromion near 100%.  Glenohumeral joint is well-preserved with small osteophytes to inferior humeral head and inferior glenoid.  No acute fracture or dislocation is demonstrated.    Scribe Attestation      I,:  Zoltan Harris PA-C am acting as a scribe while in the presence of the attending physician.:       I,:  Kendell Childs MD personally performed the services described in this documentation    as scribed " in my presence.:                    [1]   Past Medical History:  Diagnosis Date    Colon polyp    [2]   Past Surgical History:  Procedure Laterality Date    HERNIA REPAIR      OR BX PROSTATE STRTCTC SATURATION SAMPLING IMG GID N/A 2023    Procedure: TRANSPERINEAL MRI FUSION BIOPSY PROSTATE;  Surgeon: Arrno Salinas MD;  [facility];  Service: Urology    PROSTATE BIOPSY Bilateral    [3]   Family History  Problem Relation Name Age of Onset    Cancer Mother      No Known Problems Father     [4]   Social History  Tobacco Use    Smoking status: Former     Current packs/day: 0.00     Average packs/day: 0.5 packs/day for 20.0 years (10.0 ttl pk-yrs)     Types: Cigarettes     Start date:      Quit date:      Years since quittin.4     Passive exposure: Past    Smokeless tobacco: Never   Vaping Use    Vaping status: Never Used   Substance Use Topics    Alcohol use: Yes     Comment: occasional    Drug use: Never   [5]   Current Outpatient Medications:     Diclofenac Sodium (VOLTAREN) 1 %, as needed, Disp: , Rfl:     multivitamin (THERAGRAN) TABS, Take 1 tablet by mouth in the morning., Disp: , Rfl:     naproxen (Naprosyn) 500 mg tablet, Take 1 tablet (500 mg total) by mouth 2 (two) times a day with meals, Disp: 60 tablet, Rfl: 0  [6] No Known Allergies

## 2025-07-23 ENCOUNTER — APPOINTMENT (OUTPATIENT)
Dept: URGENT CARE | Facility: MEDICAL CENTER | Age: 60
End: 2025-07-23

## 2025-07-24 ENCOUNTER — OFFICE VISIT (OUTPATIENT)
Dept: OBGYN CLINIC | Facility: MEDICAL CENTER | Age: 60
End: 2025-07-24
Payer: COMMERCIAL

## 2025-07-24 VITALS — HEIGHT: 68 IN | WEIGHT: 196 LBS | BODY MASS INDEX: 29.7 KG/M2

## 2025-07-24 DIAGNOSIS — S43.102D AC SEPARATION, LEFT, SUBSEQUENT ENCOUNTER: ICD-10-CM

## 2025-07-24 DIAGNOSIS — M75.42 IMPINGEMENT SYNDROME OF LEFT SHOULDER: Primary | ICD-10-CM

## 2025-07-24 PROCEDURE — 20610 DRAIN/INJ JOINT/BURSA W/O US: CPT

## 2025-07-24 PROCEDURE — 99213 OFFICE O/P EST LOW 20 MIN: CPT | Performed by: STUDENT IN AN ORGANIZED HEALTH CARE EDUCATION/TRAINING PROGRAM

## 2025-07-24 RX ORDER — LIDOCAINE HYDROCHLORIDE 10 MG/ML
2 INJECTION, SOLUTION INFILTRATION; PERINEURAL
Status: COMPLETED | OUTPATIENT
Start: 2025-07-24 | End: 2025-07-24

## 2025-07-24 RX ORDER — BUPIVACAINE HYDROCHLORIDE 2.5 MG/ML
2 INJECTION, SOLUTION INFILTRATION; PERINEURAL
Status: COMPLETED | OUTPATIENT
Start: 2025-07-24 | End: 2025-07-24

## 2025-07-24 RX ORDER — BETAMETHASONE SODIUM PHOSPHATE AND BETAMETHASONE ACETATE 3; 3 MG/ML; MG/ML
6 INJECTION, SUSPENSION INTRA-ARTICULAR; INTRALESIONAL; INTRAMUSCULAR; SOFT TISSUE
Status: COMPLETED | OUTPATIENT
Start: 2025-07-24 | End: 2025-07-24

## 2025-07-24 RX ADMIN — BUPIVACAINE HYDROCHLORIDE 2 ML: 2.5 INJECTION, SOLUTION INFILTRATION; PERINEURAL at 07:45

## 2025-07-24 RX ADMIN — BETAMETHASONE SODIUM PHOSPHATE AND BETAMETHASONE ACETATE 6 MG: 3; 3 INJECTION, SUSPENSION INTRA-ARTICULAR; INTRALESIONAL; INTRAMUSCULAR; SOFT TISSUE at 07:45

## 2025-07-24 RX ADMIN — LIDOCAINE HYDROCHLORIDE 2 ML: 10 INJECTION, SOLUTION INFILTRATION; PERINEURAL at 07:45

## 2025-07-24 NOTE — PROGRESS NOTES
[facility] Note    Patient Name:  Vicente Bear  MRN:  [id number]  Date of last visit: 5/29/2025    Assessment/Plan:     Assessment & Plan  Impingement syndrome of left shoulder  Follow-up visit for left shoulder pain.  Discussed that his symptoms continue to be consistent with left shoulder impingement with rotator cuff tendinitis and possible tearing.  Activity recommendations: Continue with activity as tolerated.  Physical Therapy: Recommend continued formal physical therapy and home exercise program for periscapular strengthening.  Imaging recommendations: Discussed MRI to assess for rotator cuff tear.  Patient would like to avoid MRI at this time given concern for insurance coverage.  Discussed injections: Discussed corticosteroid injections to the subacromial space versus acromioclavicular joint.  Discussed that given symptoms more consistent with cuff pathology, would recommend subacromial injection at today's visit.  Discussed risk and benefits of injection.  Patient elects to proceed forward with subacromial corticosteroid injection.  Patient tolerated procedure well, documentation below.  Medication recommendations: Continue use of oral over-the-counter analgesics as needed.  Patient discontinued naproxen as he was having upset stomach after few days of taking.  AC separation, left, subsequent encounter  Follow-up visit for left shoulder pain.  Discussed that his symptoms continue to be consistent with left shoulder impingement with rotator cuff tendinitis and possible tearing.  Activity recommendations: Continue with activity as tolerated.  Physical Therapy: Recommend continued formal physical therapy and home exercise program for periscapular strengthening.  Imaging recommendations: Discussed MRI to assess for rotator cuff tear.  Patient would like to avoid MRI at this time given concern for insurance coverage.  Discussed injections: Discussed corticosteroid injections to the  "subacromial space versus acromioclavicular joint.  Discussed that given symptoms more consistent with cuff pathology, would recommend subacromial injection at today's visit.  Discussed risk and benefits of injection.  Patient elects to proceed forward with subacromial corticosteroid injection.  Patient tolerated procedure well, documentation below.  Medication recommendations: Continue use of oral over-the-counter analgesics as needed.  Patient discontinued naproxen as he was having upset stomach after few days of taking.     Return if symptoms worsen or fail to improve.      Subjective   Vicente Bear returns for follow-up of left shoulder pain, approximately 2 month(s) since last visit. At that time, in brief the plan was for:  Nonoperative management with activity modification in the gym, physical therapy for periscapular strengthening, and consideration of injection versus MRI in the future if continued pain with conservative treatment.    Currently patient presents noting: He notes that he continues to have pain in the left shoulder at today's visit.  He notes that pain into the anterior shoulder with radiation distally to the anterior upper arm.  He notes that some days he will have minimal pain, but if he attempts any types of pushing movements he will have pain, and also with repetitive overhead motion.  He denies any other new symptoms at today's visit.        Objective     Ht 5' 8\" (1.727 m)   Wt 88.9 kg (196 lb)   BMI 29.80 kg/m²     Left shoulder exam  No bruising, swelling  Mild deformity appreciated at AC joint  NonTTP cervical spine, clavicle, AC joint, acromion, scapular spine, posterior joint line, deltoid, anterior joint line, bicipital groove  Active ROM  Forward flexion: 170  External rotation in adduction: 70 bilaterally  Internal rotation: mid thoracic  Strength Testin/5 with Juan José  4/5 ER in adduction  Provocative maneuvers:   +: Speeds  -: Neer impingement, drop arm, belly press, lift " off, barron's, Hornblower's, cross body adduction  SILT C5-T1  2+ Radial pulse, symmetric with contralateral     Large joint arthrocentesis: L subacromial bursa    Performed by: Zoltan Harris PA-C  Authorized by: Zoltan Harris PA-C    Universal Protocol:  Consent: Verbal consent obtained  Risks and benefits: risks, benefits and alternatives were discussed  Consent given by: patient  Patient understanding: patient states understanding of the procedure being performed  Patient identity confirmed: verbally with patient  Supporting Documentation  Indications: pain     Is this a Visco injection? NoProcedure Details  Location: shoulder - L subacromial bursa  Needle size: 22 G  Ultrasound guidance: no  Approach: posterolateral  Medications administered: 2 mL bupivacaine 0.25 %; 2 mL lidocaine 1 %; 6 mg betamethasone acetate-betamethasone sodium phosphate 6 (3-3) mg/mL    Patient tolerance: patient tolerated the procedure well with no immediate complications  Dressing:  Sterile dressing applied          Data Review     I have personally reviewed pertinent films in PACS:    No new images reviewed at today's visit.    Pertinent PMH/Meds/Allergies reviewed as listed below:  Past Medical History[1] Current Medications[2] Allergies[3]     Zoltan Harris PA-C         [1]   Past Medical History:  Diagnosis Date    Colon polyp    [2]   Current Outpatient Medications:     Diclofenac Sodium (VOLTAREN) 1 %, as needed, Disp: , Rfl:     multivitamin (THERAGRAN) TABS, Take 1 tablet by mouth in the morning., Disp: , Rfl:     naproxen (Naprosyn) 500 mg tablet, Take 1 tablet (500 mg total) by mouth 2 (two) times a day with meals, Disp: 60 tablet, Rfl: 0  [3] No Known Allergies

## (undated) DEVICE — RAZOR STERILE

## (undated) DEVICE — UTILITY MARKER,BLACK WITH LABELS: Brand: DEVON

## (undated) DEVICE — CHLORAPREP HI-LITE 26ML ORANGE

## (undated) DEVICE — MAX-CORE® DISPOSABLE CORE BIOPSY INSTRUMENT, 18G X 25CM: Brand: MAX-CORE

## (undated) DEVICE — SCD SEQUENTIAL COMPRESSION COMFORT SLEEVE MEDIUM KNEE LENGTH: Brand: KENDALL SCD

## (undated) DEVICE — SYSTEM TRANSPERINEAL ACCESS PRECISIONPOINT

## (undated) DEVICE — GAUZE SPONGES,16 PLY: Brand: CURITY

## (undated) DEVICE — GLOVE SRG BIOGEL 7

## (undated) DEVICE — SYRINGE 10ML LL

## (undated) DEVICE — TELFA NON-ADHERENT ABSORBENT DRESSING: Brand: TELFA

## (undated) DEVICE — SPECIMEN CONTAINER STERILE PEEL PACK

## (undated) DEVICE — 3M™ IOBAN™ 2 ANTIMICROBIAL INCISE DRAPE 6640EZ: Brand: IOBAN™ 2

## (undated) DEVICE — HEAVY DUTY TABLE COVER: Brand: CONVERTORS

## (undated) DEVICE — NEEDLE 25G X 1 1/2

## (undated) DEVICE — ULTRASOUND GEL STERILE FOIL PK

## (undated) DEVICE — PREP PAD BNS: Brand: CONVERTORS